# Patient Record
Sex: FEMALE | Race: WHITE | Employment: OTHER | URBAN - METROPOLITAN AREA
[De-identification: names, ages, dates, MRNs, and addresses within clinical notes are randomized per-mention and may not be internally consistent; named-entity substitution may affect disease eponyms.]

---

## 2017-02-02 ENCOUNTER — TELEPHONE (OUTPATIENT)
Dept: PULMONOLOGY | Facility: CLINIC | Age: 72
End: 2017-02-02

## 2017-02-02 DIAGNOSIS — G47.33 OSA (OBSTRUCTIVE SLEEP APNEA): Primary | ICD-10-CM

## 2017-02-03 ENCOUNTER — TELEPHONE (OUTPATIENT)
Dept: PULMONOLOGY | Facility: CLINIC | Age: 72
End: 2017-02-03

## 2017-02-03 NOTE — TELEPHONE ENCOUNTER
Fax received for cpap supplies, signed by [facility], faxed back to [facility].

## 2017-02-03 NOTE — TELEPHONE ENCOUNTER
Left message for patient to call back. Per Dr. Sisi Watt, patient is still having too many events per hour 10.5. He would like to increase her CPAP settings from 9 to 12. We will send this order to [facility] once we speak with the patient.  Dr. Sisi Watt wants to pat

## 2017-02-10 ENCOUNTER — OFFICE VISIT (OUTPATIENT)
Dept: OBGYN CLINIC | Facility: CLINIC | Age: 72
End: 2017-02-10

## 2017-02-10 VITALS
SYSTOLIC BLOOD PRESSURE: 101 MMHG | WEIGHT: 243 LBS | BODY MASS INDEX: 45.88 KG/M2 | DIASTOLIC BLOOD PRESSURE: 66 MMHG | HEIGHT: 61 IN | HEART RATE: 56 BPM

## 2017-02-10 DIAGNOSIS — N95.0 POST-MENOPAUSAL BLEEDING: Primary | ICD-10-CM

## 2017-02-10 PROCEDURE — 99202 OFFICE O/P NEW SF 15 MIN: CPT | Performed by: OBSTETRICS & GYNECOLOGY

## 2017-02-10 NOTE — TELEPHONE ENCOUNTER
Attempted to contact patient regarding pressure change for CPAP but no answer. Letter mailed to patient with new pressure changes and to make sure a download gets done within 30 days. Order faxed to E to change settings.

## 2017-02-10 NOTE — PROGRESS NOTES
Troy Merrill is a 80year old female  No LMP recorded. Patient is postmenopausal. Patient presents with:  Gyn Problem: PMB     New pt. Here for problem. For last 3 months, would have 1-2 days of spotting each month. H/o fibroids.       OBSTETR Disp: 30 tablet Rfl: 2   Rosuvastatin Calcium (CRESTOR) 10 MG Oral Tab TAKE 1 TABLET BY MOUTH EVERY NIGHT Disp: 30 tablet Rfl: 1   modafinil 200 MG Oral Tab Take 1 tablet (200 mg total) by mouth daily.  Disp: 30 tablet Rfl: 2   Calcium Carbonate-Vitamin D 5

## 2017-02-17 ENCOUNTER — TELEPHONE (OUTPATIENT)
Dept: PULMONOLOGY | Facility: CLINIC | Age: 72
End: 2017-02-17

## 2017-02-17 NOTE — TELEPHONE ENCOUNTER
Spoke to pt who stts nobody has contacted her from [facility] to increase settings on CPAP device, informed her to contact [facility] re: having a tech come out and change settings. Re-faxed order to increase settings to [facility] [phone]. Pt voiced understanding.

## 2017-02-20 ENCOUNTER — HOSPITAL ENCOUNTER (OUTPATIENT)
Dept: ULTRASOUND IMAGING | Age: 72
Discharge: HOME | End: 2017-02-20
Attending: OBSTETRICS & GYNECOLOGY
Payer: MEDICARE

## 2017-02-20 DIAGNOSIS — N95.0 POST-MENOPAUSAL BLEEDING: ICD-10-CM

## 2017-02-20 PROCEDURE — 76830 TRANSVAGINAL US NON-OB: CPT

## 2017-02-20 PROCEDURE — 76856 US EXAM PELVIC COMPLETE: CPT

## 2017-02-22 ENCOUNTER — TELEPHONE (OUTPATIENT)
Dept: INTERNAL MEDICINE CLINIC | Facility: CLINIC | Age: 72
End: 2017-02-22

## 2017-02-22 NOTE — TELEPHONE ENCOUNTER
Called pt to schedule Medicare [address] [place] [address]. Pt states PCP is Dr. Eliana Ambrosio. Encouraged pt to schedule PX appt with PCP.

## 2017-02-24 ENCOUNTER — OFFICE VISIT (OUTPATIENT)
Dept: OBGYN CLINIC | Facility: CLINIC | Age: 72
End: 2017-02-24

## 2017-02-24 VITALS
WEIGHT: 247 LBS | HEART RATE: 54 BPM | DIASTOLIC BLOOD PRESSURE: 68 MMHG | SYSTOLIC BLOOD PRESSURE: 142 MMHG | BODY MASS INDEX: 47 KG/M2

## 2017-02-24 DIAGNOSIS — N95.0 POSTMENOPAUSAL BLEEDING: Primary | ICD-10-CM

## 2017-02-24 PROCEDURE — 99213 OFFICE O/P EST LOW 20 MIN: CPT | Performed by: OBSTETRICS & GYNECOLOGY

## 2017-02-27 ENCOUNTER — APPOINTMENT (OUTPATIENT)
Dept: LAB | Age: 72
End: 2017-02-27
Attending: INTERNAL MEDICINE
Payer: MEDICARE

## 2017-02-27 ENCOUNTER — OFFICE VISIT (OUTPATIENT)
Dept: INTERNAL MEDICINE CLINIC | Facility: CLINIC | Age: 72
End: 2017-02-27

## 2017-02-27 VITALS
HEIGHT: 61 IN | WEIGHT: 253 LBS | SYSTOLIC BLOOD PRESSURE: 144 MMHG | TEMPERATURE: 98 F | HEART RATE: 50 BPM | DIASTOLIC BLOOD PRESSURE: 88 MMHG | BODY MASS INDEX: 47.77 KG/M2

## 2017-02-27 DIAGNOSIS — M17.0 OSTEOARTHRITIS OF BOTH KNEES, UNSPECIFIED OSTEOARTHRITIS TYPE: ICD-10-CM

## 2017-02-27 DIAGNOSIS — Z80.0 FAMILY HISTORY OF COLON CANCER: ICD-10-CM

## 2017-02-27 DIAGNOSIS — Z00.01 ENCOUNTER FOR GENERAL ADULT MEDICAL EXAMINATION WITH ABNORMAL FINDINGS: ICD-10-CM

## 2017-02-27 DIAGNOSIS — I10 ESSENTIAL HYPERTENSION: ICD-10-CM

## 2017-02-27 DIAGNOSIS — E55.9 VITAMIN D DEFICIENCY: ICD-10-CM

## 2017-02-27 DIAGNOSIS — R32 URINARY INCONTINENCE, UNSPECIFIED TYPE: ICD-10-CM

## 2017-02-27 DIAGNOSIS — G25.81 RESTLESS LEGS SYNDROME: ICD-10-CM

## 2017-02-27 DIAGNOSIS — R60.0 EDEMA OF BOTH LEGS: ICD-10-CM

## 2017-02-27 DIAGNOSIS — Z01.00 EYE EXAM, ROUTINE: ICD-10-CM

## 2017-02-27 DIAGNOSIS — E78.5 HYPERLIPIDEMIA, UNSPECIFIED HYPERLIPIDEMIA TYPE: ICD-10-CM

## 2017-02-27 DIAGNOSIS — Z91.81 AT HIGH RISK FOR FALLS: ICD-10-CM

## 2017-02-27 DIAGNOSIS — J30.9 ALLERGIC RHINITIS, UNSPECIFIED ALLERGIC RHINITIS TRIGGER, UNSPECIFIED RHINITIS SEASONALITY: ICD-10-CM

## 2017-02-27 DIAGNOSIS — Z12.31 VISIT FOR SCREENING MAMMOGRAM: ICD-10-CM

## 2017-02-27 DIAGNOSIS — M15.9 OSTEOARTHRITIS OF MULTIPLE JOINTS, UNSPECIFIED OSTEOARTHRITIS TYPE: ICD-10-CM

## 2017-02-27 DIAGNOSIS — N95.0 POSTMENOPAUSAL BLEEDING: ICD-10-CM

## 2017-02-27 DIAGNOSIS — K21.9 GASTROESOPHAGEAL REFLUX DISEASE, ESOPHAGITIS PRESENCE NOT SPECIFIED: ICD-10-CM

## 2017-02-27 DIAGNOSIS — R73.9 HYPERGLYCEMIA: ICD-10-CM

## 2017-02-27 DIAGNOSIS — E66.01 MORBID OBESITY DUE TO EXCESS CALORIES (HCC): ICD-10-CM

## 2017-02-27 DIAGNOSIS — G47.33 SLEEP APNEA, OBSTRUCTIVE: ICD-10-CM

## 2017-02-27 DIAGNOSIS — N60.11 FIBROCYSTIC BREAST CHANGES OF BOTH BREASTS: ICD-10-CM

## 2017-02-27 DIAGNOSIS — G47.419 PRIMARY NARCOLEPSY WITHOUT CATAPLEXY: ICD-10-CM

## 2017-02-27 DIAGNOSIS — N60.12 FIBROCYSTIC BREAST CHANGES OF BOTH BREASTS: ICD-10-CM

## 2017-02-27 DIAGNOSIS — J45.20 MILD INTERMITTENT ASTHMA WITHOUT COMPLICATION: ICD-10-CM

## 2017-02-27 LAB
ALBUMIN SERPL BCP-MCNC: 3.8 G/DL (ref 3.5–4.8)
ALBUMIN/GLOB SERPL: 1.2 {RATIO} (ref 1–2)
ALP SERPL-CCNC: 76 U/L (ref 32–100)
ALT SERPL-CCNC: 18 U/L (ref 14–54)
ANION GAP SERPL CALC-SCNC: 8 MMOL/L (ref 0–18)
AST SERPL-CCNC: 20 U/L (ref 15–41)
BACTERIA UR QL AUTO: NEGATIVE /HPF
BILIRUB SERPL-MCNC: 0.5 MG/DL (ref 0.3–1.2)
BILIRUB UR QL: NEGATIVE
BUN SERPL-MCNC: 17 MG/DL (ref 8–20)
BUN/CREAT SERPL: 22.4 (ref 10–20)
CALCIUM SERPL-MCNC: 9.7 MG/DL (ref 8.5–10.5)
CHLORIDE SERPL-SCNC: 107 MMOL/L (ref 95–110)
CHOLEST SERPL-MCNC: 140 MG/DL (ref 110–200)
CLARITY UR: CLEAR
CO2 SERPL-SCNC: 30 MMOL/L (ref 22–32)
COLOR UR: YELLOW
CREAT SERPL-MCNC: 0.76 MG/DL (ref 0.5–1.5)
ERYTHROCYTE [DISTWIDTH] IN BLOOD BY AUTOMATED COUNT: 14.7 % (ref 11–15)
GLOBULIN PLAS-MCNC: 3.2 G/DL (ref 2.5–3.7)
GLUCOSE SERPL-MCNC: 116 MG/DL (ref 70–99)
GLUCOSE UR-MCNC: NEGATIVE MG/DL
HBA1C MFR BLD: 5.6 % (ref 4–6)
HCT VFR BLD AUTO: 39.4 % (ref 35–48)
HDLC SERPL-MCNC: 45 MG/DL
HGB BLD-MCNC: 13.2 G/DL (ref 12–16)
HGB UR QL STRIP.AUTO: NEGATIVE
KETONES UR-MCNC: NEGATIVE MG/DL
LDLC SERPL CALC-MCNC: 62 MG/DL (ref 0–99)
MCH RBC QN AUTO: 27.9 PG (ref 27–32)
MCHC RBC AUTO-ENTMCNC: 33.4 G/DL (ref 32–37)
MCV RBC AUTO: 83.5 FL (ref 80–100)
NITRITE UR QL STRIP.AUTO: NEGATIVE
NONHDLC SERPL-MCNC: 95 MG/DL
OSMOLALITY UR CALC.SUM OF ELEC: 303 MOSM/KG (ref 275–295)
PH UR: 6 [PH] (ref 5–8)
PLATELET # BLD AUTO: 176 K/UL (ref 140–400)
PMV BLD AUTO: 8.6 FL (ref 7.4–10.3)
POTASSIUM SERPL-SCNC: 4.9 MMOL/L (ref 3.3–5.1)
PROT SERPL-MCNC: 7 G/DL (ref 5.9–8.4)
PROT UR-MCNC: NEGATIVE MG/DL
RBC # BLD AUTO: 4.72 M/UL (ref 3.7–5.4)
RBC #/AREA URNS AUTO: <1 /HPF
SODIUM SERPL-SCNC: 145 MMOL/L (ref 136–144)
SP GR UR STRIP: 1.01 (ref 1–1.03)
T4 FREE SERPL-MCNC: 0.76 NG/DL (ref 0.58–1.64)
TRIGL SERPL-MCNC: 165 MG/DL (ref 1–149)
TSH SERPL-ACNC: 4.4 UIU/ML (ref 0.34–5.6)
UROBILINOGEN UR STRIP-ACNC: <2
VIT C UR-MCNC: NEGATIVE MG/DL
WBC # BLD AUTO: 5.5 K/UL (ref 4–11)
WBC #/AREA URNS AUTO: 1 /HPF

## 2017-02-27 PROCEDURE — 85027 COMPLETE CBC AUTOMATED: CPT

## 2017-02-27 PROCEDURE — 81001 URINALYSIS AUTO W/SCOPE: CPT

## 2017-02-27 PROCEDURE — 84443 ASSAY THYROID STIM HORMONE: CPT

## 2017-02-27 PROCEDURE — 84439 ASSAY OF FREE THYROXINE: CPT

## 2017-02-27 PROCEDURE — 83036 HEMOGLOBIN GLYCOSYLATED A1C: CPT

## 2017-02-27 PROCEDURE — 96160 PT-FOCUSED HLTH RISK ASSMT: CPT | Performed by: INTERNAL MEDICINE

## 2017-02-27 PROCEDURE — 82306 VITAMIN D 25 HYDROXY: CPT

## 2017-02-27 PROCEDURE — 99213 OFFICE O/P EST LOW 20 MIN: CPT | Performed by: INTERNAL MEDICINE

## 2017-02-27 PROCEDURE — 80053 COMPREHEN METABOLIC PANEL: CPT

## 2017-02-27 PROCEDURE — G0463 HOSPITAL OUTPT CLINIC VISIT: HCPCS | Performed by: INTERNAL MEDICINE

## 2017-02-27 PROCEDURE — 80061 LIPID PANEL: CPT

## 2017-02-27 PROCEDURE — 36415 COLL VENOUS BLD VENIPUNCTURE: CPT

## 2017-02-27 NOTE — PROGRESS NOTES
HPI:    Patient ID: Alexei Colby is a 80year old female.     HPI Medicare Annual exam for follow up of chonic medical probllem    Including but not limited to  Hypertension  Sleep apnea  complaitn with CPaP  Asthma   PFT  With carol response Cancer Mother      lung   • Heart Disorder Mother    • Hypertension Mother    • Heart Disorder Sister    • Fuchs' dystrophy Sister    • Heart Disorder Brother    • Glaucoma Brother    • Diabetes Other    • Macular degeneration Neg         Smoking Status: F Rfl: 1   celecoxib 200 MG Oral Cap Take 1 capsule (200 mg total) by mouth daily.  Disp: 30 capsule Rfl: 1   Esomeprazole Magnesium 40 MG Oral Capsule Delayed Release Take 1 capsule (40 mg total) by mouth every morning before breakfast. Disp: 30 capsule Rfl: Glaucoma Brother    • Diabetes Other    • Macular degeneration Neg       Social History:   Smoking Status: Former Smoker                   Packs/Day: 1.00  Years: 35        Types: Cigarettes      Quit date: 10/12/2002    Smokeless Status: Never Used Monitor    (E78.5) Hyperlipidemia, unspecified hyperlipidemia type  Plan: LIPID PANEL, FREE T4 (FREE THYROXINE), ASSAY,         THYROID STIM HORMONE        Low hcol diet adivsed    (M15.9) Osteoarthritis of multiple joints, unspecified osteoarthritis type routine  Plan: OPHTHALMOLOGY - INTERNAL        Referral given    (Z91.81) At high risk for falls  Plan: fall precuation adivsd    (J30.9) Allergic rhinitis, unspecified allergic rhinitis trigger, unspecified rhinitis seasonality  Plan: chronic  Prn otc med 55-79, do you take aspirin?: Yes    Have you had any immunizations at another office such as Influenza, Hepatitis B, Tetanus, or Pneumococcal?: No     Functional Ability     Bathing or Showering: Able without help    Toileting: Able without help    Dressin Correct    What month is it?: Correct    What year is it?: Correct    Recall \"Ball\": Correct    Recall \"Flag\": Correct    Recall \"Tree\":  Incorrect      ALLERGIES:     Adhesive Tape           Rash  Nuts                    Hives    CURRENT MEDICATIONS: Rfl: 0      MEDICAL INFORMATION:   Past Medical History   Diagnosis Date   • Hyperlipidemia    • Hyperopia with presbyopia of both eyes 10/19/2015   • Age-related nuclear cataract of both eyes 10/19/2015   • Obesity    • Restless leg syndrome    • Narcolep Assessment. PLAN SUMMARY:   Medicare Annual EXam  See above notes for detail     The patient indicates understanding of these issues and agrees to the plan. The patient is asked to return in 1 month for follow up.        PREVENTATIVE SERVICES  INDICATI 09/15/16  -PNEUMOCOCCAL VACC, 13 MADDIE IM    Update Immunization Activity if applicable    Hepatitis B No orders found for this or any previous visit. Update Immunization Activity if applicable    Tetanus No orders found for this or any previous visit.  Camilo Snellen this patient. Pap All Patients q2 year if indicated There are no preventive care reminders to display for this patient.    Mammogram Age > 39 q1 year Mammogram,1 Yr due on 07/27/2017   EKG All Patients One at initial exam completed   Vaccines:      Pneumo

## 2017-02-27 NOTE — PROGRESS NOTES
Irvin Champagne is a 80year old female  No LMP recorded. Patient is postmenopausal. Patient presents with: Follow - Up: US followup    Last seen 2/10/17. In last 3 months, having spotting x 1-2 days q month.     Had pelvic u/s on 17--  8x7x9 Take 1 tablet (600 mg total) by mouth 2 (two) times daily.  Disp: 60 tablet Rfl: 1   Montelukast Sodium 10 MG Oral Tab TAKE 1 TABLET BY MOUTH NIGHTLY Disp: 30 tablet Rfl: 1   Oxybutynin Chloride 5 MG Oral Tab Take 1 tablet (5 mg total) by mouth 2 (two) time in this encounter

## 2017-03-01 ENCOUNTER — TELEPHONE (OUTPATIENT)
Dept: INTERNAL MEDICINE CLINIC | Facility: CLINIC | Age: 72
End: 2017-03-01

## 2017-03-01 LAB — 25(OH)D3 SERPL-MCNC: 27.9 NG/ML

## 2017-03-01 NOTE — TELEPHONE ENCOUNTER
Advised patient of Dr. Dariana Sanford note. Patient verbalized understanding and will call back to schedule an appointment.

## 2017-03-01 NOTE — TELEPHONE ENCOUNTER
----- Message from Luis Felipe Graham MD sent at 3/1/2017  2:33 AM CST -----  Send  Letter and copy of test result.   Blood sugar and triglycerides are borderline high  Limit carbohydrate intake    Other labs are within normal  Come in to discuss within the ne

## 2017-03-03 ENCOUNTER — TELEPHONE (OUTPATIENT)
Dept: OBGYN CLINIC | Facility: CLINIC | Age: 72
End: 2017-03-03

## 2017-03-03 DIAGNOSIS — D25.9 UTERINE LEIOMYOMA, UNSPECIFIED LOCATION: ICD-10-CM

## 2017-03-03 DIAGNOSIS — E55.9 VITAMIN D DEFICIENCY: Primary | ICD-10-CM

## 2017-03-03 DIAGNOSIS — N95.0 POST-MENOPAUSAL BLEEDING: Primary | ICD-10-CM

## 2017-03-03 RX ORDER — ERGOCALCIFEROL 1.25 MG/1
50000 CAPSULE ORAL WEEKLY
Qty: 12 CAPSULE | Refills: 0 | Status: SHIPPED | OUTPATIENT
Start: 2017-03-03 | End: 2017-05-25

## 2017-03-03 NOTE — TELEPHONE ENCOUNTER
Spoke with pt. Informed pt that I communicated with Dr Milind Peters.   Because of difficulty visualizing and palpating her cervix, and the fact she is only spotting a couple of days q month for last 3 months, going straight to hysterectomy instead of sampling is

## 2017-03-03 NOTE — TELEPHONE ENCOUNTER
Isela. Called patient to inform her JlK was not available 5/15/17 and we were hoping 5/1/17 would work for the patient. Please confirm patient is ok with scheduling the procedure that day.

## 2017-03-07 NOTE — TELEPHONE ENCOUNTER
Attempted to call pt at [phone] \"voice mail not set up yet\". Spoke with nephew and states this is his # now and to pls call pt at home # again since was on the other line when I called.  Pt states has to go the bank on the 3rd of every month to with

## 2017-03-14 NOTE — TELEPHONE ENCOUNTER
Patient is scheduled 5/22/17 7:30am SANTY ROLDAN to assist.  Celeste Parks orders routed. Patient's instructions mailed.

## 2017-03-15 ENCOUNTER — TELEPHONE (OUTPATIENT)
Dept: INTERNAL MEDICINE CLINIC | Facility: CLINIC | Age: 72
End: 2017-03-15

## 2017-03-15 NOTE — TELEPHONE ENCOUNTER
----- Message from Miguel Law MD sent at 3/15/2017 12:22 AM CDT -----  Send  Letter and copy of test result.   Blood sugar is borderline high  Labs are otherwsie stable  She got the vit D

## 2017-03-20 NOTE — TELEPHONE ENCOUNTER
PT STATES SHE RECEIVED THE LETTER IN THE MAIL WITH ALL OF HER INSTRUCTIONS. ADVISED SHE CAN GET ANTIMICROBIAL [address] FROM ANY PLACE THAT CARRIES PHARMACY GOODS.   ENCOURAGED TO CALL BACK WITH ANY QUESTIONS OR CONCERNS.  (REFERRAL HAS BEEN APPROVED)

## 2017-03-22 ENCOUNTER — OFFICE VISIT (OUTPATIENT)
Dept: INTERNAL MEDICINE CLINIC | Facility: CLINIC | Age: 72
End: 2017-03-22

## 2017-03-22 VITALS
TEMPERATURE: 98 F | HEART RATE: 57 BPM | SYSTOLIC BLOOD PRESSURE: 108 MMHG | HEIGHT: 61 IN | WEIGHT: 250 LBS | BODY MASS INDEX: 47.2 KG/M2 | DIASTOLIC BLOOD PRESSURE: 69 MMHG

## 2017-03-22 DIAGNOSIS — R32 URINARY INCONTINENCE, UNSPECIFIED TYPE: ICD-10-CM

## 2017-03-22 DIAGNOSIS — D25.9 UTERINE LEIOMYOMA, UNSPECIFIED LOCATION: ICD-10-CM

## 2017-03-22 DIAGNOSIS — Z01.818 PREOPERATIVE CLEARANCE: Primary | ICD-10-CM

## 2017-03-22 DIAGNOSIS — K21.9 GASTROESOPHAGEAL REFLUX DISEASE, ESOPHAGITIS PRESENCE NOT SPECIFIED: ICD-10-CM

## 2017-03-22 DIAGNOSIS — M17.0 OSTEOARTHRITIS OF BOTH KNEES, UNSPECIFIED OSTEOARTHRITIS TYPE: ICD-10-CM

## 2017-03-22 DIAGNOSIS — N95.0 POSTMENOPAUSE BLEEDING: ICD-10-CM

## 2017-03-22 DIAGNOSIS — E66.01 MORBID OBESITY DUE TO EXCESS CALORIES (HCC): ICD-10-CM

## 2017-03-22 DIAGNOSIS — E78.2 MIXED HYPERLIPIDEMIA: ICD-10-CM

## 2017-03-22 DIAGNOSIS — I10 ESSENTIAL HYPERTENSION: ICD-10-CM

## 2017-03-22 DIAGNOSIS — J45.20 MILD INTERMITTENT ASTHMA WITHOUT COMPLICATION: ICD-10-CM

## 2017-03-22 DIAGNOSIS — G25.81 RESTLESS LEGS SYNDROME: ICD-10-CM

## 2017-03-22 DIAGNOSIS — M15.9 OSTEOARTHRITIS OF MULTIPLE JOINTS, UNSPECIFIED OSTEOARTHRITIS TYPE: ICD-10-CM

## 2017-03-22 DIAGNOSIS — R73.9 HYPERGLYCEMIA: ICD-10-CM

## 2017-03-22 DIAGNOSIS — Z91.81 AT HIGH RISK FOR FALLS: ICD-10-CM

## 2017-03-22 DIAGNOSIS — G47.33 SLEEP APNEA, OBSTRUCTIVE: ICD-10-CM

## 2017-03-22 PROCEDURE — 99214 OFFICE O/P EST MOD 30 MIN: CPT | Performed by: INTERNAL MEDICINE

## 2017-03-22 PROCEDURE — G0463 HOSPITAL OUTPT CLINIC VISIT: HCPCS | Performed by: INTERNAL MEDICINE

## 2017-03-22 RX ORDER — MULTIVIT-MIN/FOLIC ACID/LUTEIN 400-250MCG
1 TABLET,CHEWABLE ORAL DAILY
COMMUNITY

## 2017-03-28 ENCOUNTER — TELEPHONE (OUTPATIENT)
Dept: OBGYN CLINIC | Facility: CLINIC | Age: 72
End: 2017-03-28

## 2017-03-28 NOTE — TELEPHONE ENCOUNTER
Pt informed wash is called Hibiclens or Betasept. Instructions resent to pt. Pt verbalizes understanding.

## 2017-03-28 NOTE — TELEPHONE ENCOUNTER
PT REQ TO SPEAK WITH A NURSE BECAUSE SHE CAN'T FIND THE LETTER SHE RECEIVED THRU THE MAIL THE PRESURGICAL LETTER THAT EXPLAINED EVERYTHING / IT WAS A [address] THAT SHE NEED TO USE BEFORE THE SURGERY / SHE NEED ALL THE INSTRUCTION  / PLS ADV

## 2017-04-03 NOTE — PROGRESS NOTES
HPI:    Patient ID: Estrellita Soto is a 80year old female.     HPI    Pre OP Clearance  Surgeon Role   Ap Rangel MD Primary   Satnam Rowland MD     Procedure Laterality Anesthesia   Total abdominal hysterectomy with bilateral salpingo oophorectomy Component      Latest Ref Rng 2/27/2017   Glucose      70-99 mg/dL 116 (H)   Sodium      136-144 mmol/L 145 (H)   Potassium      3.3-5.1 mmol/L 4.9   Chloride       mmol/L 107   Carbon Dioxide, Total      22-32 mmol/L 30   BUN      8-20 mg/dL LDL Cholesterol Calc      0-99 mg/dL 62   HEMOGLOBIN A1c      4.0-6.0 % 5.6   T4,Free (Direct)      0.58-1.64 ng/dL 0.76   TSH      0.34-5.60 uIU/mL 4.40   Vitamin D, 25OH, Total       27.9   12/22/15  PFT  IMPRESSION:  1.  Significant bronchodilator res Rfl: 1   Montelukast Sodium 10 MG Oral Tab TAKE 1 TABLET BY MOUTH NIGHTLY Disp: 30 tablet Rfl: 1   Oxybutynin Chloride 5 MG Oral Tab Take 1 tablet (5 mg total) by mouth 2 (two) times daily.  Disp: 60 tablet Rfl: 1   Potassium Chloride ER 20 MEQ Oral Tab CR well-developed. No distress. HENT:   Head: Normocephalic and atraumatic. Right Ear: External ear normal.   Left Ear: External ear normal.   Nose: Nose normal.   Mouth/Throat: Oropharynx is clear and moist. No oropharyngeal exudate.    Eyes: Conjunctivae Gastroesophageal reflux disease, esophagitis presence not specified  Plan: GERD precuation    (M15.9) Osteoarthritis of multiple joints, unspecified osteoarthritis type  Plan: chronic    Hold NSAIDS ASA a week prior to surgery    (E78.2) Mixed hyperlipidem

## 2017-04-08 RX ORDER — CELECOXIB 200 MG/1
CAPSULE ORAL
Qty: 90 CAPSULE | Refills: 0 | Status: ON HOLD | OUTPATIENT
Start: 2017-04-08 | End: 2017-06-21

## 2017-04-08 NOTE — TELEPHONE ENCOUNTER
Refill Protocol Appointment Criteria  · Appointment scheduled in the past 6 months or in the next 3 months  Recent Visits       Provider Department Primary Dx    2 weeks ago Aureliano Child MD [facility], [facility], [address], [place] Preoperative clearance

## 2017-04-10 ENCOUNTER — TELEPHONE (OUTPATIENT)
Dept: PULMONOLOGY | Facility: CLINIC | Age: 72
End: 2017-04-10

## 2017-04-10 NOTE — TELEPHONE ENCOUNTER
Last ov note faxed to [facility] [phone] and left in [facility] folder for p/u.

## 2017-04-21 ENCOUNTER — LAB ENCOUNTER (OUTPATIENT)
Dept: LAB | Age: 72
End: 2017-04-21
Attending: INTERNAL MEDICINE
Payer: MEDICARE

## 2017-04-21 ENCOUNTER — OFFICE VISIT (OUTPATIENT)
Dept: OPHTHALMOLOGY | Facility: CLINIC | Age: 72
End: 2017-04-21

## 2017-04-21 ENCOUNTER — HOSPITAL ENCOUNTER (OUTPATIENT)
Dept: GENERAL RADIOLOGY | Age: 72
Discharge: HOME | End: 2017-04-21
Attending: INTERNAL MEDICINE
Payer: MEDICARE

## 2017-04-21 DIAGNOSIS — H52.03 HYPEROPIA WITH PRESBYOPIA OF BOTH EYES: ICD-10-CM

## 2017-04-21 DIAGNOSIS — Z01.818 PREOPERATIVE CLEARANCE: ICD-10-CM

## 2017-04-21 DIAGNOSIS — I10 ESSENTIAL HYPERTENSION: ICD-10-CM

## 2017-04-21 DIAGNOSIS — H52.4 HYPEROPIA WITH PRESBYOPIA OF BOTH EYES: ICD-10-CM

## 2017-04-21 DIAGNOSIS — G47.33 SLEEP APNEA, OBSTRUCTIVE: ICD-10-CM

## 2017-04-21 DIAGNOSIS — H04.123 BILATERAL DRY EYES: ICD-10-CM

## 2017-04-21 DIAGNOSIS — R73.9 HYPERGLYCEMIA: ICD-10-CM

## 2017-04-21 DIAGNOSIS — Z01.818 PREOP EXAMINATION: Primary | ICD-10-CM

## 2017-04-21 DIAGNOSIS — H01.00B BLEPHARITIS OF UPPER AND LOWER EYELIDS OF BOTH EYES, UNSPECIFIED TYPE: ICD-10-CM

## 2017-04-21 DIAGNOSIS — N95.0 POSTMENOPAUSE BLEEDING: ICD-10-CM

## 2017-04-21 DIAGNOSIS — R73.03 BORDERLINE DIABETIC: ICD-10-CM

## 2017-04-21 DIAGNOSIS — H25.13 AGE-RELATED NUCLEAR CATARACT OF BOTH EYES: Primary | ICD-10-CM

## 2017-04-21 DIAGNOSIS — H01.00A BLEPHARITIS OF UPPER AND LOWER EYELIDS OF BOTH EYES, UNSPECIFIED TYPE: ICD-10-CM

## 2017-04-21 PROCEDURE — 93010 ELECTROCARDIOGRAM REPORT: CPT | Performed by: INTERNAL MEDICINE

## 2017-04-21 PROCEDURE — 71020 XR CHEST PA + LAT CHEST (CPT=71020): CPT

## 2017-04-21 PROCEDURE — 93005 ELECTROCARDIOGRAM TRACING: CPT

## 2017-04-21 PROCEDURE — 92015 DETERMINE REFRACTIVE STATE: CPT | Performed by: OPHTHALMOLOGY

## 2017-04-21 PROCEDURE — 81001 URINALYSIS AUTO W/SCOPE: CPT

## 2017-04-21 PROCEDURE — 85730 THROMBOPLASTIN TIME PARTIAL: CPT

## 2017-04-21 PROCEDURE — 80053 COMPREHEN METABOLIC PANEL: CPT

## 2017-04-21 PROCEDURE — 92014 COMPRE OPH EXAM EST PT 1/>: CPT | Performed by: OPHTHALMOLOGY

## 2017-04-21 PROCEDURE — 85610 PROTHROMBIN TIME: CPT

## 2017-04-21 PROCEDURE — 36415 COLL VENOUS BLD VENIPUNCTURE: CPT

## 2017-04-21 PROCEDURE — 85025 COMPLETE CBC W/AUTO DIFF WBC: CPT

## 2017-04-21 RX ORDER — POTASSIUM CHLORIDE 20 MEQ/1
TABLET, EXTENDED RELEASE ORAL
COMMUNITY
Start: 2017-04-02 | End: 2017-05-18

## 2017-04-21 NOTE — PROGRESS NOTES
Oumar Arellano is a 80year old female. HPI:     HPI     Consult    Additional comments: Consult per Dr. Luis Deutsch   EP/ 80 yr old here for a complete exam. LDE 10/19/15 with hyperopia, presbyopia and cataracts OU.  Pt denies any blurre Rfl: 5   carvedilol 6.25 MG Oral Tab TAKE 1 TABLET BY MOUTH TWICE DAILY WITH MEALS Disp: 60 tablet Rfl: 1   Esomeprazole Magnesium 40 MG Oral Capsule Delayed Release Take 1 capsule (40 mg total) by mouth every morning before breakfast. Disp: 30 capsule Rfl Tonometry #2 (Applanation, 11:00 AM)      Right Left   Pressure 14 13         Pupils     3/3, 2+/2+, no APD        Visual Fields      Left Right   Result Full Full         Extraocular Movement      Right Left   Result Full Full         Dilation     Both noted.      Bilateral dry eyes  Advised pt to use OTC Systane artifical tears as needed during the day and Systane Gel at bedtime. No orders of the defined types were placed in this encounter.        Meds This Visit:    No prescriptions requested or o

## 2017-04-21 NOTE — PATIENT INSTRUCTIONS
Blepharitis of upper and lower eyelids of both eyes  Patient instructed to use lid hygiene twice daily. Apply baby shampoo to warm washcloth and scrub eyelids gently with eyes closed, then rinse thoroughly.         Age-related nuclear cataract of both eyes

## 2017-04-24 ENCOUNTER — OFFICE VISIT (OUTPATIENT)
Dept: INTERNAL MEDICINE CLINIC | Facility: CLINIC | Age: 72
End: 2017-04-24

## 2017-04-24 VITALS
SYSTOLIC BLOOD PRESSURE: 166 MMHG | RESPIRATION RATE: 16 BRPM | TEMPERATURE: 99 F | WEIGHT: 251 LBS | BODY MASS INDEX: 47 KG/M2 | HEART RATE: 56 BPM | DIASTOLIC BLOOD PRESSURE: 82 MMHG

## 2017-04-24 DIAGNOSIS — E66.01 MORBID OBESITY, UNSPECIFIED OBESITY TYPE (HCC): ICD-10-CM

## 2017-04-24 DIAGNOSIS — R60.0 EDEMA OF BOTH LEGS: ICD-10-CM

## 2017-04-24 DIAGNOSIS — I10 ESSENTIAL HYPERTENSION: ICD-10-CM

## 2017-04-24 DIAGNOSIS — Z01.818 PREOPERATIVE CLEARANCE: Primary | ICD-10-CM

## 2017-04-24 DIAGNOSIS — E78.2 MIXED HYPERLIPIDEMIA: ICD-10-CM

## 2017-04-24 DIAGNOSIS — D25.9 UTERINE LEIOMYOMA, UNSPECIFIED LOCATION: ICD-10-CM

## 2017-04-24 DIAGNOSIS — G47.33 SLEEP APNEA, OBSTRUCTIVE: ICD-10-CM

## 2017-04-24 DIAGNOSIS — N95.0 POSTMENOPAUSE BLEEDING: ICD-10-CM

## 2017-04-24 DIAGNOSIS — R73.9 HYPERGLYCEMIA: ICD-10-CM

## 2017-04-24 DIAGNOSIS — J45.20 MILD INTERMITTENT ASTHMA WITHOUT COMPLICATION: ICD-10-CM

## 2017-04-24 DIAGNOSIS — R94.31 ABNORMAL EKG: ICD-10-CM

## 2017-04-24 DIAGNOSIS — B36.9 DERMATOMYCOSIS: ICD-10-CM

## 2017-04-24 PROCEDURE — 99215 OFFICE O/P EST HI 40 MIN: CPT | Performed by: INTERNAL MEDICINE

## 2017-04-24 PROCEDURE — G0463 HOSPITAL OUTPT CLINIC VISIT: HCPCS | Performed by: INTERNAL MEDICINE

## 2017-04-24 RX ORDER — KETOCONAZOLE 20 MG/G
CREAM TOPICAL
Qty: 120 G | Refills: 2 | Status: SHIPPED | OUTPATIENT
Start: 2017-04-24

## 2017-04-26 NOTE — PROGRESS NOTES
HPI:    Patient ID: Oumar Arellano is a 80year old female.     HPI  PT is having a total hysterectomy nad oophorectomy 5/22/2017     General Information      Date: 5/22/2017 Case classification:  Status: Scheduled     Location: [facility] MAIN OR Room: OR 02 Ser Diabetes Other    • Macular degeneration Neg         Smoking Status: Former Smoker                   Packs/Day: 1.00  Years: 35        Types: Cigarettes      Quit date: 10/12/2002    Smokeless Status: Never Used                        Alcohol Use: No Inhale 2 puffs into the lungs 4 (four) times daily as needed for Wheezing.  Disp: 1 Inhaler Rfl: 5   carvedilol 6.25 MG Oral Tab TAKE 1 TABLET BY MOUTH TWICE DAILY WITH MEALS Disp: 60 tablet Rfl: 1   Esomeprazole Magnesium 40 MG Oral Capsule Delayed Release and  optimization of comfort.  It is notable, however, that when the patient  turns on her back, there is an increase in respiratory events. RECOMMENDATIONS:  1)     Patient should avoid the supine position.   2.     CPAP 9 CWP with C-Flex set to 3 CWP usi pulses. Exam reveals no gallop. No murmur heard. Pulmonary/Chest: Effort normal and breath sounds normal. No respiratory distress. She has no wheezes. She has no rales. She exhibits no tenderness. Abdominal: Soft.  Bowel sounds are normal. She exhibi Absolute      0.0-1.0 K/UL 0.4   Eosinophils Absolute      0.0-0.7 K/UL 0.1   Basophils Absolute      0.0-0.2 K/UL 0.0   URINE-COLOR      Yellow Yellow   APPEARANCE      Clear Hazy (A)   SPECIFIC GRAVITY      1.002-1.035 1.010   PH, URINE      5.0-8.0 6.0 cardiac clearance prior to Surgery.    (D25.9) Uterine leiomyoma, unspecified location  Plan: as above    (N95.0) Postmenopause bleeding  Plan: as above    (R94.31) Abnormal EKG  Plan: cardiac clearance  Has an appt with  Cardiology next week    (I10) Keli

## 2017-05-02 ENCOUNTER — TELEPHONE (OUTPATIENT)
Dept: INTERNAL MEDICINE CLINIC | Facility: CLINIC | Age: 72
End: 2017-05-02

## 2017-05-02 DIAGNOSIS — E78.5 HYPERLIPIDEMIA, UNSPECIFIED HYPERLIPIDEMIA TYPE: Primary | ICD-10-CM

## 2017-05-02 DIAGNOSIS — R94.31 ECG ABNORMAL: ICD-10-CM

## 2017-05-03 ENCOUNTER — OFFICE VISIT (OUTPATIENT)
Dept: CARDIOLOGY CLINIC | Facility: CLINIC | Age: 72
End: 2017-05-03

## 2017-05-03 VITALS
SYSTOLIC BLOOD PRESSURE: 186 MMHG | HEART RATE: 60 BPM | WEIGHT: 253 LBS | DIASTOLIC BLOOD PRESSURE: 88 MMHG | HEIGHT: 61.5 IN | BODY MASS INDEX: 47.16 KG/M2 | RESPIRATION RATE: 20 BRPM

## 2017-05-03 DIAGNOSIS — Z01.810 PREOP CARDIOVASCULAR EXAM: ICD-10-CM

## 2017-05-03 DIAGNOSIS — I10 ESSENTIAL HYPERTENSION: ICD-10-CM

## 2017-05-03 DIAGNOSIS — E78.2 MIXED HYPERLIPIDEMIA: Primary | ICD-10-CM

## 2017-05-03 DIAGNOSIS — R94.31 ABNORMAL EKG: ICD-10-CM

## 2017-05-03 PROCEDURE — 99204 OFFICE O/P NEW MOD 45 MIN: CPT | Performed by: INTERNAL MEDICINE

## 2017-05-03 PROCEDURE — G0463 HOSPITAL OUTPT CLINIC VISIT: HCPCS | Performed by: INTERNAL MEDICINE

## 2017-05-03 NOTE — PROGRESS NOTES
Cardiology Consult Note    5/3/2017    Tan Syed is a 80year old female.   HPI:   This is a 67-year-old female who presents for initial evaluation patient is scheduled for total hysterectomy and oophorectomy on May 22, 2017 was noted to have abnormal Disp: 30 tablet Rfl: 1   Oxybutynin Chloride 5 MG Oral Tab Take 1 tablet (5 mg total) by mouth 2 (two) times daily. Disp: 60 tablet Rfl: 1   Potassium Chloride ER 20 MEQ Oral Tab CR Take 20 mEq by mouth daily.  Disp: 30 tablet Rfl: 2   Rosuvastatin Calcium without murmur,nl S1,S2,good distal pulse  GI: good BS's,no masses, HSM or tenderness  EXTREMITIES: no cyanosis, clubbing, +trace edema  NEURO:no focal deficits      Assessment  ASSESSMENT AND PLAN:     (E78.2) Mixed hyperlipidemia  (primary encounter diag

## 2017-05-05 ENCOUNTER — TELEPHONE (OUTPATIENT)
Dept: PULMONOLOGY | Facility: CLINIC | Age: 72
End: 2017-05-05

## 2017-05-05 NOTE — TELEPHONE ENCOUNTER
LMTCB. The pulm office can obtain data download through [facility] website.

## 2017-05-08 NOTE — TELEPHONE ENCOUNTER
Pt stts her pressure was changed on her cpap machine. Pt would like her data download given to the Dr. Mj Carson for review. Pt notified that a data download will be obtained and placed in Dr. Mj Carson folder for review.

## 2017-05-11 ENCOUNTER — HOSPITAL ENCOUNTER (OUTPATIENT)
Dept: NUCLEAR MEDICINE | Facility: HOSPITAL | Age: 72
Discharge: HOME | End: 2017-05-11
Attending: INTERNAL MEDICINE
Payer: MEDICARE

## 2017-05-11 ENCOUNTER — HOSPITAL ENCOUNTER (OUTPATIENT)
Dept: CV DIAGNOSTICS | Facility: HOSPITAL | Age: 72
Discharge: HOME | End: 2017-05-11
Attending: INTERNAL MEDICINE
Payer: MEDICARE

## 2017-05-11 DIAGNOSIS — R94.31 ABNORMAL EKG: ICD-10-CM

## 2017-05-11 PROCEDURE — 93016 CV STRESS TEST SUPVJ ONLY: CPT | Performed by: INTERNAL MEDICINE

## 2017-05-11 PROCEDURE — 93018 CV STRESS TEST I&R ONLY: CPT | Performed by: INTERNAL MEDICINE

## 2017-05-11 PROCEDURE — 78452 HT MUSCLE IMAGE SPECT MULT: CPT | Performed by: INTERNAL MEDICINE

## 2017-05-11 PROCEDURE — 93017 CV STRESS TEST TRACING ONLY: CPT | Performed by: INTERNAL MEDICINE

## 2017-05-11 RX ORDER — 0.9 % SODIUM CHLORIDE 0.9 %
VIAL (ML) INJECTION
Status: COMPLETED
Start: 2017-05-11 | End: 2017-05-11

## 2017-05-11 RX ORDER — SODIUM CHLORIDE 9 MG/ML
INJECTION, SOLUTION INTRAVENOUS
Status: DISPENSED
Start: 2017-05-11 | End: 2017-05-12

## 2017-05-11 RX ADMIN — 0.9 % SODIUM CHLORIDE 10 ML: 0.9 % VIAL (ML) INJECTION at 14:01:00

## 2017-05-11 NOTE — TELEPHONE ENCOUNTER
Per Dr. Jiménez Basket. Pt cpap data download showed acceptable usage and AHI. Data download sent to scanning.

## 2017-05-11 NOTE — IMAGING NOTE
Patient here for outpatient nuclear exercise stress test, ordered by Dr. Emilio Chung. Patient states she will not be able to walk on treadmill due to bilateral arthritic knees. Also, patient had bilateral calf edema.  I called Dr. Juliette Lacy, cardiology go-to for t

## 2017-05-17 ENCOUNTER — TELEPHONE (OUTPATIENT)
Dept: OBGYN CLINIC | Facility: CLINIC | Age: 72
End: 2017-05-17

## 2017-05-17 ENCOUNTER — APPOINTMENT (OUTPATIENT)
Dept: LAB | Age: 72
End: 2017-05-17
Attending: INTERNAL MEDICINE
Payer: MEDICARE

## 2017-05-17 DIAGNOSIS — Z01.818 PREOP EXAMINATION: ICD-10-CM

## 2017-05-17 PROCEDURE — 87641 MR-STAPH DNA AMP PROBE: CPT

## 2017-05-18 NOTE — TELEPHONE ENCOUNTER
Patient called and stated she lost her instructions and I advised her to pick them up at the  and asked if she'd taken any asprin, patient stated she took asprin today 5/18/17. Patient advised not to take anymore aspirin prior to surgery.   I inf

## 2017-05-19 NOTE — TELEPHONE ENCOUNTER
P.A.T. AND SURGERY  NOTIFIED CASE IS CANCELLED DUE TO PT ON ASPIRIN 325MG THROUGH YESTERDAY PER [facility] (N CALL). JLK AND NJG NOTIFIED.

## 2017-05-22 NOTE — TELEPHONE ENCOUNTER
GOT A VOICE MAIL MESSAGE FROM FEMALE THAT SAID SHE IS PT'S DAUGHTER-IN-LAW AND ASKED US TO CALL HER ABOUT RESCHEDULING THE SURGERY. SPOKE WITH PT'S DAUGHTER-IN-LAW AND EXPLAINED THERE IS NO SIGNED ALLEGRA TO DISCUSS DATES OR THE SURGERY WITH HER.   TRITCB FOR T

## 2017-05-23 NOTE — TELEPHONE ENCOUNTER
Patient's procedure is rescheduled to 6/19/17 7:30 [facility] SANTY/JAMAR. Pat orders routed. Patient states she has instructions remailed.

## 2017-05-23 NOTE — TELEPHONE ENCOUNTER
Morning Dr. Martinez Muro,    Will you be available to assist with this procedure 6/19/17 at 7:30am?      Maggy

## 2017-05-25 RX ORDER — ERGOCALCIFEROL 1.25 MG/1
CAPSULE ORAL
Qty: 3 CAPSULE | Refills: 3 | Status: SHIPPED | OUTPATIENT
Start: 2017-05-25

## 2017-06-06 NOTE — TELEPHONE ENCOUNTER
Dr. Esteban Argueta: please confirm continue use of furosemide.     Hypertensive Medications  Protocol Criteria:  · Appointment scheduled in the past 6 months or in the next 3 months  · BMP or CMP in the past 12 months  · Creatinine result < 2  Recent Visits

## 2017-06-06 NOTE — TELEPHONE ENCOUNTER
Please advise on continue use of Lily Randy; there is another refill request for furosemide.   Refill Protocol Appointment Criteria  · Appointment scheduled in the past 6 months or in the next 3 months  Recent Visits       Provider Department Primary Dx    1 m

## 2017-06-07 ENCOUNTER — TELEPHONE (OUTPATIENT)
Dept: OBGYN CLINIC | Facility: CLINIC | Age: 72
End: 2017-06-07

## 2017-06-07 NOTE — TELEPHONE ENCOUNTER
SWETHA CALLING FROM PAT REQUESTING ANOTHER SURGERY REQUEST FORM / NEED PT CORRECT NAME ON IT FOR HER SURGERY WHICH IS SCHEDULE FOR  06/19/17 / PT NAME IS KALEY PEREZ / NICK ADV

## 2017-06-13 RX ORDER — ROSUVASTATIN CALCIUM 10 MG/1
TABLET, COATED ORAL
Qty: 30 TABLET | Refills: 0 | Status: SHIPPED | OUTPATIENT
Start: 2017-06-13

## 2017-06-13 RX ORDER — POTASSIUM CHLORIDE 20 MEQ/1
TABLET, EXTENDED RELEASE ORAL
Qty: 30 TABLET | Refills: 0 | Status: SHIPPED | OUTPATIENT
Start: 2017-06-13 | End: 2017-12-18

## 2017-06-13 NOTE — TELEPHONE ENCOUNTER
Cholesterol Medications  Refilled per protocol.    Protocol Criteria:  · Appointment scheduled in the past 12 months or in the next 3 months  · ALT & LDL on file in the past 12 months  · ALT result < 80  · LDL result <130   Recent Visits       Provider Willa

## 2017-06-13 NOTE — TELEPHONE ENCOUNTER
Refilled per protocol   On lasix, K+ normal  Hypertensive Medications  Protocol Criteria:  · Appointment scheduled in the past 6 months or in the next 3 months  · BMP or CMP in the past 12 months  · Creatinine result < 2  Recent Visits       Provider Depar

## 2017-06-14 ENCOUNTER — LAB ENCOUNTER (OUTPATIENT)
Dept: LAB | Age: 72
End: 2017-06-14
Attending: OBSTETRICS & GYNECOLOGY
Payer: MEDICARE

## 2017-06-14 DIAGNOSIS — Z01.818 PREOP TESTING: ICD-10-CM

## 2017-06-14 PROCEDURE — 86901 BLOOD TYPING SEROLOGIC RH(D): CPT

## 2017-06-14 PROCEDURE — 36415 COLL VENOUS BLD VENIPUNCTURE: CPT

## 2017-06-14 PROCEDURE — 86900 BLOOD TYPING SEROLOGIC ABO: CPT

## 2017-06-14 PROCEDURE — 86850 RBC ANTIBODY SCREEN: CPT

## 2017-06-15 RX ORDER — FUROSEMIDE 20 MG/1
TABLET ORAL
Qty: 60 TABLET | Refills: 0 | Status: SHIPPED | OUTPATIENT
Start: 2017-06-15 | End: 2017-08-30

## 2017-06-16 NOTE — H&P
Pre-Operative History and Physical        HPI:  Pepper Cook is a 80year old  No LMP recorded. Patient is postmenopausal. who presents for post menopausal bleeding. Having spotting 1-2 days q month for several months.     Had pelvic u/s on  x 14 days, Disp: 120 g, Rfl: 2  •  CELECOXIB 200 MG Oral Cap, TAKE 1 CAPSULE(200 MG) BY MOUTH DAILY, Disp: 90 capsule, Rfl: 0  •  Albuterol Sulfate  (90 BASE) MCG/ACT Inhalation Aero Soln, Inhale 2 puffs into the lungs 4 (four) times daily as needed SKIN DISINFECTANT CALLED LAINA    Social History   Marital Status: Single  Spouse Name: N/A    Years of Education: N/A  Number of Children: N/A     Occupational History  None on file     Social History Main Topics   Smoking status: Former Smoker  1.00 Pa

## 2017-06-19 ENCOUNTER — SURGERY (OUTPATIENT)
Age: 72
End: 2017-06-19

## 2017-06-19 ENCOUNTER — ANESTHESIA EVENT (OUTPATIENT)
Dept: SURGERY | Facility: HOSPITAL | Age: 72
DRG: 743 | End: 2017-06-19
Payer: MEDICARE

## 2017-06-19 ENCOUNTER — HOSPITAL ENCOUNTER (INPATIENT)
Facility: HOSPITAL | Age: 72
LOS: 2 days | Discharge: HOME | DRG: 743 | End: 2017-06-21
Attending: OBSTETRICS & GYNECOLOGY | Admitting: OBSTETRICS & GYNECOLOGY
Payer: MEDICARE

## 2017-06-19 ENCOUNTER — TELEPHONE (OUTPATIENT)
Dept: OBGYN CLINIC | Facility: CLINIC | Age: 72
End: 2017-06-19

## 2017-06-19 ENCOUNTER — ANESTHESIA (OUTPATIENT)
Dept: SURGERY | Facility: HOSPITAL | Age: 72
DRG: 743 | End: 2017-06-19
Payer: MEDICARE

## 2017-06-19 DIAGNOSIS — N95.0 POST-MENOPAUSAL BLEEDING: ICD-10-CM

## 2017-06-19 DIAGNOSIS — D25.9 UTERINE LEIOMYOMA, UNSPECIFIED LOCATION: ICD-10-CM

## 2017-06-19 DIAGNOSIS — Z01.818 PREOP TESTING: Primary | ICD-10-CM

## 2017-06-19 PROCEDURE — 0UT70ZZ RESECTION OF BILATERAL FALLOPIAN TUBES, OPEN APPROACH: ICD-10-PCS | Performed by: OBSTETRICS & GYNECOLOGY

## 2017-06-19 PROCEDURE — 0UTC0ZZ RESECTION OF CERVIX, OPEN APPROACH: ICD-10-PCS | Performed by: OBSTETRICS & GYNECOLOGY

## 2017-06-19 PROCEDURE — 0UT20ZZ RESECTION OF BILATERAL OVARIES, OPEN APPROACH: ICD-10-PCS | Performed by: OBSTETRICS & GYNECOLOGY

## 2017-06-19 PROCEDURE — 0UT90ZZ RESECTION OF UTERUS, OPEN APPROACH: ICD-10-PCS | Performed by: OBSTETRICS & GYNECOLOGY

## 2017-06-19 RX ORDER — NEOSTIGMINE METHYLSULFATE 0.5 MG/ML
INJECTION INTRAVENOUS AS NEEDED
Status: DISCONTINUED | OUTPATIENT
Start: 2017-06-19 | End: 2017-06-19 | Stop reason: SURG

## 2017-06-19 RX ORDER — KETOROLAC TROMETHAMINE 30 MG/ML
30 INJECTION, SOLUTION INTRAMUSCULAR; INTRAVENOUS EVERY 6 HOURS
Status: DISPENSED | OUTPATIENT
Start: 2017-06-19 | End: 2017-06-20

## 2017-06-19 RX ORDER — SODIUM CHLORIDE, SODIUM LACTATE, POTASSIUM CHLORIDE, CALCIUM CHLORIDE 600; 310; 30; 20 MG/100ML; MG/100ML; MG/100ML; MG/100ML
INJECTION, SOLUTION INTRAVENOUS CONTINUOUS
Status: DISCONTINUED | OUTPATIENT
Start: 2017-06-19 | End: 2017-06-20

## 2017-06-19 RX ORDER — NALOXONE HYDROCHLORIDE 0.4 MG/ML
80 INJECTION, SOLUTION INTRAMUSCULAR; INTRAVENOUS; SUBCUTANEOUS AS NEEDED
Status: DISCONTINUED | OUTPATIENT
Start: 2017-06-19 | End: 2017-06-19 | Stop reason: HOSPADM

## 2017-06-19 RX ORDER — FUROSEMIDE 20 MG/1
20 TABLET ORAL
Status: DISCONTINUED | OUTPATIENT
Start: 2017-06-19 | End: 2017-06-21

## 2017-06-19 RX ORDER — GABAPENTIN 300 MG/1
600 CAPSULE ORAL 2 TIMES DAILY
Status: DISCONTINUED | OUTPATIENT
Start: 2017-06-19 | End: 2017-06-21

## 2017-06-19 RX ORDER — HYDROCODONE BITARTRATE AND ACETAMINOPHEN 5; 325 MG/1; MG/1
1 TABLET ORAL AS NEEDED
Status: DISCONTINUED | OUTPATIENT
Start: 2017-06-19 | End: 2017-06-19 | Stop reason: HOSPADM

## 2017-06-19 RX ORDER — METOPROLOL TARTRATE 5 MG/5ML
2.5 INJECTION INTRAVENOUS ONCE
Status: DISCONTINUED | OUTPATIENT
Start: 2017-06-19 | End: 2017-06-19 | Stop reason: HOSPADM

## 2017-06-19 RX ORDER — ONDANSETRON 2 MG/ML
4 INJECTION INTRAMUSCULAR; INTRAVENOUS EVERY 6 HOURS PRN
Status: DISCONTINUED | OUTPATIENT
Start: 2017-06-19 | End: 2017-06-21

## 2017-06-19 RX ORDER — DIPHENHYDRAMINE HYDROCHLORIDE 50 MG/ML
12.5 INJECTION INTRAMUSCULAR; INTRAVENOUS EVERY 4 HOURS PRN
Status: DISCONTINUED | OUTPATIENT
Start: 2017-06-19 | End: 2017-06-19

## 2017-06-19 RX ORDER — ENOXAPARIN SODIUM 100 MG/ML
40 INJECTION SUBCUTANEOUS DAILY
Status: DISCONTINUED | OUTPATIENT
Start: 2017-06-19 | End: 2017-06-20

## 2017-06-19 RX ORDER — LIDOCAINE HYDROCHLORIDE 10 MG/ML
INJECTION, SOLUTION EPIDURAL; INFILTRATION; INTRACAUDAL; PERINEURAL AS NEEDED
Status: DISCONTINUED | OUTPATIENT
Start: 2017-06-19 | End: 2017-06-19 | Stop reason: SURG

## 2017-06-19 RX ORDER — SODIUM CHLORIDE, SODIUM LACTATE, POTASSIUM CHLORIDE, CALCIUM CHLORIDE 600; 310; 30; 20 MG/100ML; MG/100ML; MG/100ML; MG/100ML
INJECTION, SOLUTION INTRAVENOUS CONTINUOUS PRN
Status: DISCONTINUED | OUTPATIENT
Start: 2017-06-19 | End: 2017-06-19 | Stop reason: SURG

## 2017-06-19 RX ORDER — ROCURONIUM BROMIDE 10 MG/ML
INJECTION, SOLUTION INTRAVENOUS AS NEEDED
Status: DISCONTINUED | OUTPATIENT
Start: 2017-06-19 | End: 2017-06-19 | Stop reason: SURG

## 2017-06-19 RX ORDER — CARVEDILOL 6.25 MG/1
6.25 TABLET ORAL 2 TIMES DAILY WITH MEALS
Status: DISCONTINUED | OUTPATIENT
Start: 2017-06-19 | End: 2017-06-21

## 2017-06-19 RX ORDER — DOCUSATE SODIUM 100 MG/1
100 CAPSULE, LIQUID FILLED ORAL 2 TIMES DAILY
Status: DISCONTINUED | OUTPATIENT
Start: 2017-06-20 | End: 2017-06-21

## 2017-06-19 RX ORDER — MORPHINE SULFATE 2 MG/ML
2 INJECTION, SOLUTION INTRAMUSCULAR; INTRAVENOUS EVERY 10 MIN PRN
Status: DISCONTINUED | OUTPATIENT
Start: 2017-06-19 | End: 2017-06-19 | Stop reason: HOSPADM

## 2017-06-19 RX ORDER — MORPHINE SULFATE 10 MG/ML
6 INJECTION, SOLUTION INTRAMUSCULAR; INTRAVENOUS EVERY 10 MIN PRN
Status: DISCONTINUED | OUTPATIENT
Start: 2017-06-19 | End: 2017-06-19 | Stop reason: HOSPADM

## 2017-06-19 RX ORDER — HYDROMORPHONE HYDROCHLORIDE 1 MG/ML
0.4 INJECTION, SOLUTION INTRAMUSCULAR; INTRAVENOUS; SUBCUTANEOUS EVERY 5 MIN PRN
Status: DISCONTINUED | OUTPATIENT
Start: 2017-06-19 | End: 2017-06-19 | Stop reason: HOSPADM

## 2017-06-19 RX ORDER — ALBUTEROL SULFATE 90 UG/1
2 AEROSOL, METERED RESPIRATORY (INHALATION) 4 TIMES DAILY PRN
Status: DISCONTINUED | OUTPATIENT
Start: 2017-06-19 | End: 2017-06-21

## 2017-06-19 RX ORDER — DIPHENHYDRAMINE HYDROCHLORIDE 50 MG/ML
12.5 INJECTION INTRAMUSCULAR; INTRAVENOUS EVERY 4 HOURS PRN
Status: DISCONTINUED | OUTPATIENT
Start: 2017-06-19 | End: 2017-06-21

## 2017-06-19 RX ORDER — HYDROCODONE BITARTRATE AND ACETAMINOPHEN 5; 325 MG/1; MG/1
2 TABLET ORAL AS NEEDED
Status: DISCONTINUED | OUTPATIENT
Start: 2017-06-19 | End: 2017-06-19 | Stop reason: HOSPADM

## 2017-06-19 RX ORDER — NALOXONE HYDROCHLORIDE 0.4 MG/ML
0.08 INJECTION, SOLUTION INTRAMUSCULAR; INTRAVENOUS; SUBCUTANEOUS
Status: DISCONTINUED | OUTPATIENT
Start: 2017-06-19 | End: 2017-06-21

## 2017-06-19 RX ORDER — SODIUM CHLORIDE 0.9 % (FLUSH) 0.9 %
10 SYRINGE (ML) INJECTION AS NEEDED
Status: DISCONTINUED | OUTPATIENT
Start: 2017-06-19 | End: 2017-06-21

## 2017-06-19 RX ORDER — DEXAMETHASONE SODIUM PHOSPHATE 4 MG/ML
VIAL (ML) INJECTION AS NEEDED
Status: DISCONTINUED | OUTPATIENT
Start: 2017-06-19 | End: 2017-06-19 | Stop reason: SURG

## 2017-06-19 RX ORDER — IBUPROFEN 600 MG/1
600 TABLET ORAL EVERY 6 HOURS PRN
Status: DISCONTINUED | OUTPATIENT
Start: 2017-06-20 | End: 2017-06-21

## 2017-06-19 RX ORDER — MIDAZOLAM HYDROCHLORIDE 1 MG/ML
INJECTION INTRAMUSCULAR; INTRAVENOUS AS NEEDED
Status: DISCONTINUED | OUTPATIENT
Start: 2017-06-19 | End: 2017-06-19 | Stop reason: SURG

## 2017-06-19 RX ORDER — POTASSIUM CHLORIDE 20 MEQ/1
20 TABLET, EXTENDED RELEASE ORAL
Status: DISCONTINUED | OUTPATIENT
Start: 2017-06-19 | End: 2017-06-21

## 2017-06-19 RX ORDER — PANTOPRAZOLE SODIUM 40 MG/1
40 TABLET, DELAYED RELEASE ORAL
Status: DISCONTINUED | OUTPATIENT
Start: 2017-06-19 | End: 2017-06-21

## 2017-06-19 RX ORDER — NALBUPHINE HCL 10 MG/ML
2.5 AMPUL (ML) INJECTION EVERY 4 HOURS PRN
Status: DISCONTINUED | OUTPATIENT
Start: 2017-06-19 | End: 2017-06-21

## 2017-06-19 RX ORDER — ONDANSETRON 2 MG/ML
4 INJECTION INTRAMUSCULAR; INTRAVENOUS EVERY 8 HOURS PRN
Status: DISCONTINUED | OUTPATIENT
Start: 2017-06-19 | End: 2017-06-21

## 2017-06-19 RX ORDER — FAMOTIDINE 20 MG/1
20 TABLET ORAL ONCE
Status: COMPLETED | OUTPATIENT
Start: 2017-06-19 | End: 2017-06-19

## 2017-06-19 RX ORDER — ACETAMINOPHEN 325 MG/1
650 TABLET ORAL ONCE
Status: COMPLETED | OUTPATIENT
Start: 2017-06-19 | End: 2017-06-19

## 2017-06-19 RX ORDER — ONDANSETRON 4 MG/1
4 TABLET, FILM COATED ORAL EVERY 8 HOURS PRN
Status: DISCONTINUED | OUTPATIENT
Start: 2017-06-19 | End: 2017-06-21

## 2017-06-19 RX ORDER — ONDANSETRON 2 MG/ML
INJECTION INTRAMUSCULAR; INTRAVENOUS AS NEEDED
Status: DISCONTINUED | OUTPATIENT
Start: 2017-06-19 | End: 2017-06-19 | Stop reason: SURG

## 2017-06-19 RX ORDER — HYDROMORPHONE HYDROCHLORIDE 1 MG/ML
0.2 INJECTION, SOLUTION INTRAMUSCULAR; INTRAVENOUS; SUBCUTANEOUS EVERY 5 MIN PRN
Status: DISCONTINUED | OUTPATIENT
Start: 2017-06-19 | End: 2017-06-19 | Stop reason: HOSPADM

## 2017-06-19 RX ORDER — MORPHINE SULFATE 4 MG/ML
4 INJECTION, SOLUTION INTRAMUSCULAR; INTRAVENOUS EVERY 10 MIN PRN
Status: DISCONTINUED | OUTPATIENT
Start: 2017-06-19 | End: 2017-06-19 | Stop reason: HOSPADM

## 2017-06-19 RX ORDER — HYDROMORPHONE HYDROCHLORIDE 1 MG/ML
0.6 INJECTION, SOLUTION INTRAMUSCULAR; INTRAVENOUS; SUBCUTANEOUS EVERY 5 MIN PRN
Status: DISCONTINUED | OUTPATIENT
Start: 2017-06-19 | End: 2017-06-19 | Stop reason: HOSPADM

## 2017-06-19 RX ORDER — GLYCOPYRROLATE 0.2 MG/ML
INJECTION INTRAMUSCULAR; INTRAVENOUS AS NEEDED
Status: DISCONTINUED | OUTPATIENT
Start: 2017-06-19 | End: 2017-06-19 | Stop reason: SURG

## 2017-06-19 RX ORDER — DEXTROSE, SODIUM CHLORIDE, SODIUM LACTATE, POTASSIUM CHLORIDE, AND CALCIUM CHLORIDE 5; .6; .31; .03; .02 G/100ML; G/100ML; G/100ML; G/100ML; G/100ML
INJECTION, SOLUTION INTRAVENOUS CONTINUOUS
Status: DISCONTINUED | OUTPATIENT
Start: 2017-06-19 | End: 2017-06-20

## 2017-06-19 RX ORDER — MONTELUKAST SODIUM 10 MG/1
10 TABLET ORAL NIGHTLY
Status: DISCONTINUED | OUTPATIENT
Start: 2017-06-19 | End: 2017-06-21

## 2017-06-19 RX ORDER — ONDANSETRON 2 MG/ML
4 INJECTION INTRAMUSCULAR; INTRAVENOUS ONCE AS NEEDED
Status: COMPLETED | OUTPATIENT
Start: 2017-06-19 | End: 2017-06-19

## 2017-06-19 RX ORDER — HYDROCODONE BITARTRATE AND ACETAMINOPHEN 7.5; 325 MG/1; MG/1
1 TABLET ORAL EVERY 4 HOURS PRN
Status: DISCONTINUED | OUTPATIENT
Start: 2017-06-20 | End: 2017-06-21

## 2017-06-19 RX ADMIN — DEXAMETHASONE SODIUM PHOSPHATE 4 MG: 4 MG/ML VIAL (ML) INJECTION at 07:47:00

## 2017-06-19 RX ADMIN — SODIUM CHLORIDE, SODIUM LACTATE, POTASSIUM CHLORIDE, CALCIUM CHLORIDE: 600; 310; 30; 20 INJECTION, SOLUTION INTRAVENOUS at 09:24:00

## 2017-06-19 RX ADMIN — SODIUM CHLORIDE, SODIUM LACTATE, POTASSIUM CHLORIDE, CALCIUM CHLORIDE: 600; 310; 30; 20 INJECTION, SOLUTION INTRAVENOUS at 07:36:00

## 2017-06-19 RX ADMIN — ONDANSETRON 4 MG: 2 INJECTION INTRAMUSCULAR; INTRAVENOUS at 09:14:00

## 2017-06-19 RX ADMIN — NEOSTIGMINE METHYLSULFATE 3 MG: 0.5 INJECTION INTRAVENOUS at 09:27:00

## 2017-06-19 RX ADMIN — MIDAZOLAM HYDROCHLORIDE 2 MG: 1 INJECTION INTRAMUSCULAR; INTRAVENOUS at 07:36:00

## 2017-06-19 RX ADMIN — GLYCOPYRROLATE 0.4 MG: 0.2 INJECTION INTRAMUSCULAR; INTRAVENOUS at 09:27:00

## 2017-06-19 RX ADMIN — LIDOCAINE HYDROCHLORIDE 50 MG: 10 INJECTION, SOLUTION EPIDURAL; INFILTRATION; INTRACAUDAL; PERINEURAL at 07:41:00

## 2017-06-19 RX ADMIN — ROCURONIUM BROMIDE 50 MG: 10 INJECTION, SOLUTION INTRAVENOUS at 07:41:00

## 2017-06-19 NOTE — OPERATIVE REPORT
Abdominal Hysterectomy Operative Note      Date:  6/19/2017    Patient Name: Brent Santo    Preoperative Diagnosis: Post menopausal bleeding, fibroid uterus     Postoperative Diagnosis: Post menopausal bleeding, fibroid uterus     Primary Surgeon: Fredy Mitchell clamped, cut, and suture ligated with 0-Vicryl. The winsome clamps were placed below the cervix and cut. The specimen was sent off the surgical field.  The vaginal cuff angles were closed with figure of eight sutures using 0-Vicryl incorporating the Jarrell

## 2017-06-19 NOTE — RESPIRATORY THERAPY NOTE
Kvng Duron FOUZIA ASSESSMENT:    Pt does have a previous diagnosis of FOUZIA. Pt does routinely use a CPAP device at home. CPAP INITIATION:    Pt to be placed on CPAP: yes  Pt refused: no  CPAP settings: Auto Pap 5 min cm H2O. 20 max cm H2O.  Mask Option:  Full face  Int

## 2017-06-19 NOTE — TELEPHONE ENCOUNTER
Nurse from PACU calling for [facility] [phone], states she needs an order for a pt she just did surgery on. [facility] pager given to Jovanny Hammond RN.

## 2017-06-19 NOTE — ANESTHESIA PREPROCEDURE EVALUATION
Anesthesia PreOp Note    HPI:     Joreg Rhodes is a 80year old female who presents for preoperative consultation requested by: Jacqueline Nguyen MD    Date of Surgery: 6/19/2017    Procedure(s):  ABDOMINAL HYSTERECTOMY, BSO  Indication: Post menopausal ble BOTH KNEES   • Esophageal reflux    • High blood pressure    • Cataract AGE RELATED 10/2015   • Shortness of breath    • Sleep apnea      cpap   • Restless leg syndrome    • Peripheral edema    • Asthma            Past Surgical History    TUBAL LIGATION monthly ) Disp: 3 capsule Rfl: 3 6/17/2017   multiple vitamin Oral Chew Tab Chew 1 tablet by mouth daily. Disp:  Rfl:  6/18/2017 at 0900   modafinil 200 MG Oral Tab Take 1 tablet (200 mg total) by mouth daily.  (Patient taking differently: Take 200 mg by mo No    Sexual Activity: Not on file   Not on file  Other Topics Concern   None on file     Social History Narrative       Available pre-op labs reviewed.     Lab Results  Component Value Date   WBC 6.5 06/19/2017   RBC 4.50 06/19/2017   HGB 12.6 06/19/2017 any alternative forms of anesthetic management. All of the patient's questions were answered to the best of my ability. The patient desires the anesthetic management as planned.   BOY CAIN  6/19/2017 6:53 AM

## 2017-06-19 NOTE — ANESTHESIA POSTPROCEDURE EVALUATION
Patient: Pepper Cook    Procedure Summary     Date Anesthesia Start Anesthesia Stop Room / Location    06/19/17 0736 [id number] [facility] MAIN OR 04 / 300 [facility] MAIN OR       Procedure Diagnosis Surgeon Responsible Provider    ABDOMINAL HYSTERECTOMY, BSO (N/A Abdomen)

## 2017-06-19 NOTE — INTERVAL H&P NOTE
Pre-op Diagnosis: Post menopausal bleeding, fibroid uterus     The above referenced H&P was reviewed by Jonnie Guerrero MD on 6/19/2017, the patient was examined and no significant changes have occurred in the patient's condition since the H&P was performed.   I

## 2017-06-20 RX ORDER — ENOXAPARIN SODIUM 100 MG/ML
30 INJECTION SUBCUTANEOUS EVERY 12 HOURS SCHEDULED
Status: DISCONTINUED | OUTPATIENT
Start: 2017-06-20 | End: 2017-06-21

## 2017-06-20 NOTE — RESPIRATORY THERAPY NOTE
RESPIRATORY THERAPY CPAP PROGRESS NOTE:    Patient is compliant with nightly CPAP: No  Patient refused: No  AutoCPAP in use: Yes  [facility] EPAP noted: 8cmH20  CPAP interface:Full face mask  Patient tolerating: well

## 2017-06-20 NOTE — CM/SW NOTE
CTL met with pt at bedside. Prior to admission pt lived in a house with her son, dtr-in-law and 2 other family members. Pt states that she was independent with all ADL's including driving and shopping. Pt has been up to bathroom and voided.   States is p

## 2017-06-20 NOTE — PROGRESS NOTES
[facility]    Gyn Progress Note      Saniya Carney Patient Status:  Inpatient    1945 MRN [id number]   Location [facility] 4W/SW/SE Attending Lois Feldman MD   Hosp Day # 1 PCP Delvis Gandhi MD       Subjective     Good p

## 2017-06-20 NOTE — PROGRESS NOTES
[facility] Pharmacy Note:  Weight Dose Adjustment for: Lovenox    Vidya April is a 80year old female who has been prescribed Lovenox 40 mg sc q24 hours. CrCl is estimated creatinine clearance is 54 mL/min (based on Cr of 0.75).  and pt has a weight 113 kg

## 2017-06-21 VITALS
BODY MASS INDEX: 46.6 KG/M2 | RESPIRATION RATE: 18 BRPM | TEMPERATURE: 98 F | WEIGHT: 250 LBS | HEART RATE: 52 BPM | HEIGHT: 61.5 IN | OXYGEN SATURATION: 92 % | SYSTOLIC BLOOD PRESSURE: 129 MMHG | DIASTOLIC BLOOD PRESSURE: 53 MMHG

## 2017-06-21 RX ORDER — HYDROCODONE BITARTRATE AND ACETAMINOPHEN 7.5; 325 MG/1; MG/1
1 TABLET ORAL EVERY 6 HOURS PRN
Qty: 30 TABLET | Refills: 0 | Status: SHIPPED | OUTPATIENT
Start: 2017-06-21

## 2017-06-21 RX ORDER — IBUPROFEN 600 MG/1
600 TABLET ORAL EVERY 6 HOURS PRN
Qty: 30 TABLET | Refills: 0 | Status: SHIPPED | OUTPATIENT
Start: 2017-06-21 | End: 2017-07-20

## 2017-06-21 NOTE — PROGRESS NOTES
[facility]    Gyn Progress Note      Riesa Patient Patient Status:  Inpatient    1945 MRN [id number]   Location [facility] 4W/SW/SE Attending Demetrius Martinez MD   Hosp Day # 2 PCP Rob Glass MD       Subjective     Good p

## 2017-06-21 NOTE — RESPIRATORY THERAPY NOTE
RESPIRATORY THERAPY CPAP PROGRESS NOTE:    Patient is compliant with nightly CPAP: yes  Patient refused: no  AutoCPAP in use: yes     CPAP interface:Full face  Patient tolerating: well

## 2017-06-21 NOTE — DISCHARGE SUMMARY
[facility]    Discharge Summary    Valeria Hong Patient Status:  Inpatient    1945 MRN [id number]   Location [facility] 4W/SW/SE Attending Hua Gong MD   Hosp Day # 2 PCP Bev Dowell MD     Date of Admission:  needed.     Quantity:  30 tablet   Refills:  0         CHANGE how you take these medications       Instructions Prescription details    ergocalciferol 29292 units Caps   Commonly known as:  DRISDOL/VITAMIN D2   What changed:    - how much to take  - when to days    Quantity:  120 g   Refills:  2       multiple vitamin Chew        Chew 1 tablet by mouth daily. Refills:  0       Oxybutynin Chloride 5 MG Tabs   Commonly known as:  DITROPAN        Take 1 tablet (5 mg total) by mouth 2 (two) times daily.     Daikwaku Cutting

## 2017-07-20 ENCOUNTER — OFFICE VISIT (OUTPATIENT)
Dept: OBGYN CLINIC | Facility: CLINIC | Age: 72
End: 2017-07-20

## 2017-07-20 VITALS
HEART RATE: 67 BPM | BODY MASS INDEX: 45 KG/M2 | WEIGHT: 242 LBS | DIASTOLIC BLOOD PRESSURE: 56 MMHG | SYSTOLIC BLOOD PRESSURE: 87 MMHG

## 2017-07-20 DIAGNOSIS — Z98.890 POST-OPERATIVE STATE: Primary | ICD-10-CM

## 2017-07-20 PROCEDURE — 99024 POSTOP FOLLOW-UP VISIT: CPT | Performed by: OBSTETRICS & GYNECOLOGY

## 2017-07-20 RX ORDER — IBUPROFEN 600 MG/1
600 TABLET ORAL EVERY 6 HOURS PRN
Qty: 30 TABLET | Refills: 1 | Status: SHIPPED | OUTPATIENT
Start: 2017-07-20

## 2017-07-20 NOTE — PROGRESS NOTES
Osmin German is a 80year old female  No LMP recorded. Patient has had a hysterectomy. Patient presents with:  Post-Op: 4 week Postop visit for Hysterectomy. Reporting still having soreness     Here for post op exam.   Had LUCY/BSO on 17. tablet Rfl: 0   POTASSIUM CHLORIDE ER 20 MEQ Oral Tab CR TAKE 1 TABLET BY MOUTH DAILY Disp: 30 tablet Rfl: 0   ergocalciferol 13579 units Oral Cap Take 1 po monthly (Patient taking differently: 50,000 Units once a week.  Take 1 po monthly ) Disp: 3 capsule wash  Erythromycin            Rash  Nuts                    Hives  Other                   Rash    Comment:ALLERGIC TO A SKIN DISINFECTANT CALLED ZAFRINE      PHYSICAL EXAM:   BP (!) 87/56 (BP Location: Right arm, Patient Position: Sitting, Cuff Size: larg

## 2017-08-03 ENCOUNTER — TELEPHONE (OUTPATIENT)
Dept: INTERNAL MEDICINE CLINIC | Facility: CLINIC | Age: 72
End: 2017-08-03

## 2017-08-03 NOTE — TELEPHONE ENCOUNTER
Pharmacy called to follow up.        Montelukast Sodium 10 MG Oral Tab TAKE 1 TABLET BY MOUTH NIGHTLY Disp: 30 tablet Rfl: 1

## 2017-08-09 NOTE — TELEPHONE ENCOUNTER
This medication was last filled 12/8 30/1 refill. Calling to see how frequently pt takes medication and for what symptoms. Please transfer to  [phone], thank you.

## 2017-08-10 ENCOUNTER — TELEPHONE (OUTPATIENT)
Dept: OBGYN CLINIC | Facility: CLINIC | Age: 72
End: 2017-08-10

## 2017-08-10 RX ORDER — MONTELUKAST SODIUM 10 MG/1
TABLET ORAL
Qty: 30 TABLET | Refills: 1 | Status: SHIPPED | OUTPATIENT
Start: 2017-08-10

## 2017-08-10 NOTE — TELEPHONE ENCOUNTER
Refill Protocol Appointment Criteria  · Appointment scheduled in the past 6 months or in the next 3 months  Recent Outpatient Visits            3 weeks ago Post-operative [facility] [place] [facility] for Ajith Millan MD    Off

## 2017-08-10 NOTE — TELEPHONE ENCOUNTER
Routed to [facility] staff, to Redirect. Patient was sent to [place] [id number] in error.

## 2017-08-30 DIAGNOSIS — R32 URINARY INCONTINENCE, UNSPECIFIED TYPE: ICD-10-CM

## 2017-08-31 RX ORDER — FUROSEMIDE 20 MG/1
TABLET ORAL
Qty: 60 TABLET | Refills: 0 | Status: SHIPPED | OUTPATIENT
Start: 2017-08-31 | End: 2017-10-03

## 2017-09-01 RX ORDER — OXYBUTYNIN CHLORIDE 5 MG/1
TABLET ORAL
Qty: 60 TABLET | Refills: 0 | Status: SHIPPED | OUTPATIENT
Start: 2017-09-01 | End: 2017-10-03

## 2017-09-01 NOTE — TELEPHONE ENCOUNTER
Refilled per protocol.    Refill Protocol Appointment Criteria  · Appointment scheduled in the past 6 months or in the next 3 months  Recent Outpatient Visits            1 month ago Post-operative [facility] for Health, [address], [place]

## 2017-10-03 DIAGNOSIS — R60.0 EDEMA OF BOTH LEGS: ICD-10-CM

## 2017-10-03 DIAGNOSIS — I10 ESSENTIAL HYPERTENSION WITH GOAL BLOOD PRESSURE LESS THAN 140/90: ICD-10-CM

## 2017-10-03 DIAGNOSIS — R32 URINARY INCONTINENCE, UNSPECIFIED TYPE: ICD-10-CM

## 2017-10-04 RX ORDER — FUROSEMIDE 20 MG/1
TABLET ORAL
Qty: 60 TABLET | Refills: 0 | Status: SHIPPED | OUTPATIENT
Start: 2017-10-04 | End: 2017-11-13

## 2017-10-04 RX ORDER — CARVEDILOL 6.25 MG/1
TABLET ORAL
Qty: 60 TABLET | Refills: 0 | Status: SHIPPED | OUTPATIENT
Start: 2017-10-04 | End: 2017-10-15

## 2017-10-04 RX ORDER — OXYBUTYNIN CHLORIDE 5 MG/1
TABLET ORAL
Qty: 60 TABLET | Refills: 0 | Status: SHIPPED | OUTPATIENT
Start: 2017-10-04 | End: 2017-11-13

## 2017-10-04 NOTE — TELEPHONE ENCOUNTER
Hypertensive Medications  Protocol Criteria:  · Appointment scheduled in the past 6 months or in the next 3 months  · BMP or CMP in the past 12 months  · Creatinine result < 2  Recent Outpatient Visits            2 months ago Post-operative [facility]    [facility] Raymond Cardenas MD    Office Visit    6 months ago Preoperative clearance    Harris Edmondson MD    Office Visit

## 2017-10-12 RX ORDER — GABAPENTIN 600 MG/1
TABLET ORAL
Qty: 60 TABLET | Refills: 2 | Status: SHIPPED | OUTPATIENT
Start: 2017-10-12 | End: 2018-03-23

## 2017-10-12 RX ORDER — POTASSIUM CHLORIDE 20 MEQ/1
TABLET, EXTENDED RELEASE ORAL
Qty: 30 TABLET | Refills: 2 | Status: SHIPPED | OUTPATIENT
Start: 2017-10-12 | End: 2018-03-23

## 2017-10-12 NOTE — TELEPHONE ENCOUNTER
Called Pt to inform rx were sent to pharmacy and receipt confirmed by them . Pt stated she has not called the office to inquire about the rx and it might be the pharmacy calling . Pt verbalized understanding denied questions .

## 2017-10-12 NOTE — TELEPHONE ENCOUNTER
Refill Protocol Appointment Criteria  · Appointment scheduled in the past 6 months or in the next 3 months  Recent Outpatient Visits            2 months ago Post-operative [facility] [place] [facility] for Jose Alejandro Cruz MD    Of

## 2017-10-15 DIAGNOSIS — I10 ESSENTIAL HYPERTENSION WITH GOAL BLOOD PRESSURE LESS THAN 140/90: ICD-10-CM

## 2017-10-19 RX ORDER — CARVEDILOL 6.25 MG/1
TABLET ORAL
Qty: 60 TABLET | Refills: 1 | Status: SHIPPED | OUTPATIENT
Start: 2017-10-19 | End: 2018-01-28

## 2017-11-13 DIAGNOSIS — R32 URINARY INCONTINENCE, UNSPECIFIED TYPE: ICD-10-CM

## 2017-11-15 RX ORDER — FUROSEMIDE 20 MG/1
TABLET ORAL
Qty: 180 TABLET | Refills: 1 | Status: SHIPPED | OUTPATIENT
Start: 2017-11-15

## 2017-11-15 RX ORDER — OXYBUTYNIN CHLORIDE 5 MG/1
TABLET ORAL
Qty: 180 TABLET | Refills: 1 | Status: SHIPPED | OUTPATIENT
Start: 2017-11-15

## 2017-12-01 ENCOUNTER — TELEPHONE (OUTPATIENT)
Dept: INTERNAL MEDICINE CLINIC | Facility: CLINIC | Age: 72
End: 2017-12-01

## 2017-12-06 NOTE — TELEPHONE ENCOUNTER
Please advise on refill request. Patient stated that she takes Celecoxib for arthritis of knees and it was stopped for her June surgery, but she resumed it afterwards.  Medication was not on her med list. Scheduled an appointment for her annual exam for 12/18/17 at 11 am.    Recent Outpatient Visits            4 months ago Post-operative [facility]    Office Visit    7 months ago Mixed hyperlipidemia    [address] Card[facility] Arlin Arriola MD    Office Visit    7 months ago Preoperative clearance    Murleen Sandifer, MD    Office Visit    7 months ago Age-related nuclear cataract of both eyes    [facility] Ped Opthamology Karen Collins MD    Office Visit    8 months ago Preoperative clearance    [facility], Nirmala Saxena MD    Office Visit

## 2017-12-07 RX ORDER — CELECOXIB 200 MG/1
CAPSULE ORAL
Qty: 90 CAPSULE | Refills: 0 | Status: SHIPPED | OUTPATIENT
Start: 2017-12-07 | End: 2018-03-23

## 2017-12-18 ENCOUNTER — OFFICE VISIT (OUTPATIENT)
Dept: INTERNAL MEDICINE CLINIC | Facility: CLINIC | Age: 72
End: 2017-12-18

## 2017-12-18 ENCOUNTER — APPOINTMENT (OUTPATIENT)
Dept: LAB | Age: 72
End: 2017-12-18
Attending: INTERNAL MEDICINE
Payer: MEDICARE

## 2017-12-18 VITALS
TEMPERATURE: 98 F | DIASTOLIC BLOOD PRESSURE: 77 MMHG | SYSTOLIC BLOOD PRESSURE: 124 MMHG | HEART RATE: 52 BPM | WEIGHT: 243 LBS | BODY MASS INDEX: 45.88 KG/M2 | HEIGHT: 61 IN

## 2017-12-18 DIAGNOSIS — I10 ESSENTIAL HYPERTENSION WITH GOAL BLOOD PRESSURE LESS THAN 140/90: ICD-10-CM

## 2017-12-18 DIAGNOSIS — M15.9 OSTEOARTHRITIS OF MULTIPLE JOINTS, UNSPECIFIED OSTEOARTHRITIS TYPE: ICD-10-CM

## 2017-12-18 DIAGNOSIS — G47.33 SLEEP APNEA, OBSTRUCTIVE: ICD-10-CM

## 2017-12-18 DIAGNOSIS — Z78.0 POSTMENOPAUSAL: ICD-10-CM

## 2017-12-18 DIAGNOSIS — Z00.00 MEDICARE ANNUAL WELLNESS VISIT, SUBSEQUENT: Primary | ICD-10-CM

## 2017-12-18 DIAGNOSIS — J30.9 CHRONIC ALLERGIC RHINITIS, UNSPECIFIED SEASONALITY, UNSPECIFIED TRIGGER: ICD-10-CM

## 2017-12-18 DIAGNOSIS — R32 URINARY INCONTINENCE, UNSPECIFIED TYPE: ICD-10-CM

## 2017-12-18 DIAGNOSIS — J44.9 ASTHMA WITH COPD (CHRONIC OBSTRUCTIVE PULMONARY DISEASE) (HCC): ICD-10-CM

## 2017-12-18 DIAGNOSIS — G25.81 RESTLESS LEGS SYNDROME: ICD-10-CM

## 2017-12-18 DIAGNOSIS — E78.5 HYPERLIPIDEMIA, UNSPECIFIED HYPERLIPIDEMIA TYPE: ICD-10-CM

## 2017-12-18 DIAGNOSIS — E55.9 VITAMIN D DEFICIENCY: ICD-10-CM

## 2017-12-18 DIAGNOSIS — K21.9 GASTROESOPHAGEAL REFLUX DISEASE, ESOPHAGITIS PRESENCE NOT SPECIFIED: ICD-10-CM

## 2017-12-18 DIAGNOSIS — R73.9 HYPERGLYCEMIA: ICD-10-CM

## 2017-12-18 DIAGNOSIS — Z12.31 VISIT FOR SCREENING MAMMOGRAM: ICD-10-CM

## 2017-12-18 DIAGNOSIS — E66.01 MORBID OBESITY DUE TO EXCESS CALORIES (HCC): ICD-10-CM

## 2017-12-18 PROBLEM — N95.0 POST-MENOPAUSAL BLEEDING: Status: RESOLVED | Noted: 2017-06-19 | Resolved: 2017-12-18

## 2017-12-18 PROBLEM — H04.123 BILATERAL DRY EYES: Status: RESOLVED | Noted: 2017-04-21 | Resolved: 2017-12-18

## 2017-12-18 PROBLEM — H01.00A BLEPHARITIS OF UPPER AND LOWER EYELIDS OF BOTH EYES: Status: RESOLVED | Noted: 2017-04-21 | Resolved: 2017-12-18

## 2017-12-18 PROBLEM — H01.00B BLEPHARITIS OF UPPER AND LOWER EYELIDS OF BOTH EYES: Status: RESOLVED | Noted: 2017-04-21 | Resolved: 2017-12-18

## 2017-12-18 PROBLEM — J44.89 ASTHMA WITH COPD (CHRONIC OBSTRUCTIVE PULMONARY DISEASE): Chronic | Status: ACTIVE | Noted: 2017-12-18

## 2017-12-18 PROBLEM — D25.9 UTERINE LEIOMYOMA: Status: RESOLVED | Noted: 2017-06-19 | Resolved: 2017-12-18

## 2017-12-18 PROBLEM — H25.13 AGE-RELATED NUCLEAR CATARACT OF BOTH EYES: Status: RESOLVED | Noted: 2017-04-21 | Resolved: 2017-12-18

## 2017-12-18 PROCEDURE — 84443 ASSAY THYROID STIM HORMONE: CPT

## 2017-12-18 PROCEDURE — 82306 VITAMIN D 25 HYDROXY: CPT

## 2017-12-18 PROCEDURE — 84439 ASSAY OF FREE THYROXINE: CPT

## 2017-12-18 PROCEDURE — 81015 MICROSCOPIC EXAM OF URINE: CPT

## 2017-12-18 PROCEDURE — 85027 COMPLETE CBC AUTOMATED: CPT

## 2017-12-18 PROCEDURE — G0008 ADMIN INFLUENZA VIRUS VAC: HCPCS | Performed by: INTERNAL MEDICINE

## 2017-12-18 PROCEDURE — 80053 COMPREHEN METABOLIC PANEL: CPT

## 2017-12-18 PROCEDURE — 90732 PPSV23 VACC 2 YRS+ SUBQ/IM: CPT | Performed by: INTERNAL MEDICINE

## 2017-12-18 PROCEDURE — 90472 IMMUNIZATION ADMIN EACH ADD: CPT | Performed by: INTERNAL MEDICINE

## 2017-12-18 PROCEDURE — 80061 LIPID PANEL: CPT

## 2017-12-18 PROCEDURE — 83036 HEMOGLOBIN GLYCOSYLATED A1C: CPT

## 2017-12-18 PROCEDURE — 99213 OFFICE O/P EST LOW 20 MIN: CPT | Performed by: INTERNAL MEDICINE

## 2017-12-18 PROCEDURE — 36415 COLL VENOUS BLD VENIPUNCTURE: CPT

## 2017-12-18 PROCEDURE — 90653 IIV ADJUVANT VACCINE IM: CPT | Performed by: INTERNAL MEDICINE

## 2017-12-18 PROCEDURE — G0009 ADMIN PNEUMOCOCCAL VACCINE: HCPCS | Performed by: INTERNAL MEDICINE

## 2017-12-18 PROCEDURE — G0463 HOSPITAL OUTPT CLINIC VISIT: HCPCS | Performed by: INTERNAL MEDICINE

## 2017-12-18 PROCEDURE — 96160 PT-FOCUSED HLTH RISK ASSMT: CPT | Performed by: INTERNAL MEDICINE

## 2017-12-18 NOTE — PROGRESS NOTES
HPI:    Patient ID: Troy Doherty is a 71year old female.     [facility]    Medicare annual exam and follow of chronic medical problems  Including but not limited to    HTN  Long standing history of hypertension     sympotms  :        Headache no  dizziness TABLET BY MOUTH TWICE DAILY.  Disp: 60 tablet Rfl: 2   POTASSIUM CHLORIDE ER 20 MEQ Oral Tab CR TAKE 1 TABLET BY MOUTH DAILY Disp: 30 tablet Rfl: 2   Montelukast Sodium 10 MG Oral Tab TAKE 1 TABLET BY MOUTH NIGHTLY Disp: 30 tablet Rfl: 1   ibuprofen 600 MG mouth 2 (two) times daily. Disp: 60 tablet Rfl: 2   aspirin 325 MG Oral Tab Take 325 mg by mouth daily. Disp:  Rfl:      Allergies:  Adhesive Tape           Rash    Comment:Skin tears  Aloe Vesta              Rash, Itching    Comment: Body wash  Erythromyci reactive to light. Right eye exhibits no discharge. Left eye exhibits no discharge. No scleral icterus. Neck: Neck supple. No thyromegaly present. Cardiovascular: Normal rate, regular rhythm, normal heart sounds and intact distal pulses.   Exam reveals with COPD (chronic obstructive pulmonary disease) (HCC)  Plan: chornic stable    (E55.9) Vitamin D deficiency  Plan: supplement    (R32) Urinary incontinence, unspecified type  Plan: follow up with urology    (G25.81) Restless legs syndrome  Plan: chronic (gastroesophageal reflux disease)     Urinary incontinence     Mixed hyperlipidemia     Hyperglycemia     Hyperopia with presbyopia of both eyes     Borderline diabetic     Asthma with COPD (chronic obstructive pulmonary disease) ([facility] [address]      General Health Questionnaire completed by patient and sent to HIM for scanning? No    Please go to \"Cognitive Assessment\" under Medicare Assessment section in Charting, test patient and document. Then, refresh your progress note to see your input here.   Cognitive Ass External Cream Apply bid to affected area x 14 days Disp: 120 g Rfl: 2   multiple vitamin Oral Chew Tab Chew 1 tablet by mouth daily.  Disp:  Rfl:    Albuterol Sulfate  (90 BASE) MCG/ACT Inhalation Aero Soln Inhale 2 puffs into the lungs 4 (four) va Heart Disorder Brother    • Glaucoma Brother    • Diabetes Other    • Macular degeneration Neg       SOCIAL HISTORY:   Smoking status: Former Smoker                                                              Packs/day: 1.00      Years: 35.00        Types Screen every 10 years Colonoscopy,5 Years due on 01/12/2021 Update Health Maintenance if applicable    Flex Sigmoidoscopy Screen every 10 years No results found for this or any previous visit. No flowsheet data found.      Fecal Occult Blood Annually No res Creatinine (mg/dL)   Date Value   12/18/2017 0.84     CREATININE (P) (mg/dL)   Date Value   09/16/2016 0.81    No flowsheet data found. BUN  Annually BUN (mg/dL)   Date Value   12/18/2017 24 (H)   09/16/2016 21 (H)    No flowsheet data found.      Drug S orders found for this or any previous visit.          SUGGESTED VACCINATIONS - Influenza, Pneumococcal, Zoster, Tetanus     Immunization History  Administered            Date(s) Administered    Fluad High dose 72 yr and older (89987)

## 2018-01-10 ENCOUNTER — TELEPHONE (OUTPATIENT)
Dept: INTERNAL MEDICINE CLINIC | Facility: CLINIC | Age: 73
End: 2018-01-10

## 2018-01-28 DIAGNOSIS — I10 ESSENTIAL HYPERTENSION WITH GOAL BLOOD PRESSURE LESS THAN 140/90: ICD-10-CM

## 2018-01-31 RX ORDER — CARVEDILOL 6.25 MG/1
TABLET ORAL
Qty: 60 TABLET | Refills: 2 | Status: SHIPPED | OUTPATIENT
Start: 2018-01-31 | End: 2018-03-23

## 2018-01-31 NOTE — TELEPHONE ENCOUNTER
Hypertensive Medications: Refilled per protocol    Protocol Criteria:  · Appointment scheduled in the past 6 months or in the next 3 months  · BMP or CMP in the past 12 months  · Creatinine result < 2  Recent Outpatient Visits            1 month ago [facility]

## 2018-03-23 DIAGNOSIS — I10 ESSENTIAL HYPERTENSION WITH GOAL BLOOD PRESSURE LESS THAN 140/90: ICD-10-CM

## 2018-03-23 DIAGNOSIS — R60.0 EDEMA OF BOTH LEGS: ICD-10-CM

## 2018-03-23 RX ORDER — POTASSIUM CHLORIDE 20 MEQ/1
20 TABLET, EXTENDED RELEASE ORAL
Qty: 90 TABLET | Refills: 0 | Status: SHIPPED | OUTPATIENT
Start: 2018-03-23

## 2018-03-23 RX ORDER — CELECOXIB 200 MG/1
CAPSULE ORAL
Qty: 90 CAPSULE | Refills: 0 | Status: SHIPPED | OUTPATIENT
Start: 2018-03-23

## 2018-03-23 RX ORDER — CARVEDILOL 6.25 MG/1
TABLET ORAL
Qty: 180 TABLET | Refills: 0 | Status: SHIPPED | OUTPATIENT
Start: 2018-03-23

## 2018-03-23 RX ORDER — GABAPENTIN 600 MG/1
600 TABLET ORAL 2 TIMES DAILY
Qty: 180 TABLET | Refills: 0 | Status: SHIPPED | OUTPATIENT
Start: 2018-03-23

## 2018-03-23 NOTE — TELEPHONE ENCOUNTER
Please advise on refill request. For Dr Josi Stallings. [place] pharmacy. See patient's note.     Hypertensive Medications  Protocol Criteria:  · Appointment scheduled in the past 6 months or in the next 3 months  · BMP or CMP in the past 12 months  · Creatin Cardiology Shanit Singer MD    Office Visit    11 months ago Preoperative clearance    Oren Blizzard, MD    Office Visit    11 months ago Age-related nuclear cataract of both eyes    [facility] [place] [facility] for [address]

## 2018-03-23 NOTE — TELEPHONE ENCOUNTER
Pt is requesting refills :   She just moved to [facility] and is looking for a new pcp. They don't have anything till May, pt says. pls advise. Pharmacy updated in [email or web address]. Pt is requesting 3 mo supply.        Current Outpatient Prescriptions:  CARVEDILOL 6.25 MG

## 2019-01-16 RX ORDER — FUROSEMIDE 20 MG/1
TABLET ORAL
Qty: 60 TABLET | Refills: 2 | OUTPATIENT
Start: 2019-01-16

## 2019-01-16 RX ORDER — OXYBUTYNIN CHLORIDE 5 MG/1
TABLET ORAL
Qty: 60 TABLET | Refills: 0 | OUTPATIENT
Start: 2019-01-16

## 2019-01-16 RX ORDER — CARVEDILOL 6.25 MG/1
TABLET ORAL
Qty: 60 TABLET | Refills: 2 | OUTPATIENT
Start: 2019-01-16

## 2020-12-15 ENCOUNTER — TELEPHONE (OUTPATIENT)
Dept: GASTROENTEROLOGY | Facility: CLINIC | Age: 75
End: 2020-12-15

## 2020-12-15 NOTE — TELEPHONE ENCOUNTER
----- Message from Lisa Jett RN sent at 1/14/2016 10:37 AM CST -----  Regarding: CLN recall  5 year CLN recall, per Dr. Josefina Long; CLN done 1/12/16

## 2021-02-16 NOTE — TELEPHONE ENCOUNTER
Letter was returned to us labeled \"moved\"   I called and left a voicemail message for patient to call us back.

## 2021-03-03 DIAGNOSIS — Z23 NEED FOR VACCINATION: ICD-10-CM

## 2024-08-06 NOTE — ASSESSMENT & PLAN NOTE
Advised pt to use OTC Systane artifical tears as needed during the day and Systane Gel at bedtime.
Discussed very early cataracts with patient. No treatment recommended at this time.
New glasses today; suggest update.
No background of retinopathy, no signs of neovascularization noted.
Patient instructed to use lid hygiene twice daily. Apply baby shampoo to warm washcloth and scrub eyelids gently with eyes closed, then rinse thoroughly.
No indicators present

## (undated) DEVICE — UNDYED BRAIDED (POLYGLACTIN 910), SYNTHETIC ABSORBABLE SUTURE: Brand: COATED VICRYL

## (undated) DEVICE — STERILE LATEX POWDER-FREE SURGICAL GLOVESWITH NITRILE COATING: Brand: PROTEXIS

## (undated) DEVICE — SPONGE: SPECIALTY PEANUT XR 100/CS: Brand: MEDICAL ACTION INDUSTRIES

## (undated) DEVICE — Device

## (undated) DEVICE — SPONGE LAP 18X18 XRAY STRL

## (undated) DEVICE — USE ITEM #176901

## (undated) DEVICE — SOL H2O 1000ML BTL

## (undated) DEVICE — DRAPE SHEET TRANSVERSE LAP

## (undated) DEVICE — CONTAINER SPEC STR 4OZ GRY LID

## (undated) DEVICE — 3M™ MEDITPORE™ SOFT CLOTH TAPE 6 IN X 10 YD 12 ROLLS/CASE 2966: Brand: 3M™ MEDIPORE™

## (undated) DEVICE — SUTURE VICRYL 0 J340H

## (undated) DEVICE — 3M™ STERI-STRIP™ COMPOUND BENZOIN TINCTURE 40 BAGS/CARTON 4 CARTONS/CASE C1544: Brand: 3M™ STERI-STRIP™

## (undated) DEVICE — WOUND RETRACTOR AND PROTECTOR: Brand: ALEXIS O WOUND PROTECTOR-RETRACTOR

## (undated) DEVICE — CHLORAPREP 26ML APPLICATOR

## (undated) DEVICE — SUTURE PLAIN GUT 3-0 CT-1

## (undated) DEVICE — LAPAROTOMY: Brand: MEDLINE INDUSTRIES, INC.

## (undated) DEVICE — KENDALL SCD EXPRESS SLEEVES, KNEE LENGTH, MEDIUM: Brand: KENDALL SCD

## (undated) DEVICE — SUTURE VICRYL 0 CT-1

## (undated) DEVICE — 3M™ STERI-STRIP™ REINFORCED ADHESIVE SKIN CLOSURES, R1547, 1/2 IN X 4 IN (12 MM X 100 MM), 6 STRIPS/ENVELOPE: Brand: 3M™ STERI-STRIP™

## (undated) NOTE — MR AVS SNAPSHOT
[place]  [address]  [phone]               Thank you for choosing us for your health care visit with [facility].  Tangela Pride MD.  We are glad to serve you and happy to provide you with this dolan Borderline diabetic  No background of retinopathy, no signs of neovascularization noted. Bilateral dry eyes  Advised pt to use OTC Systane artifical tears as needed during the day and Systane Gel at bedtime.             Allergies as of Apr 21, 20 Chew 1 tablet by mouth daily. Oxybutynin Chloride 5 MG Tabs   Take 1 tablet (5 mg total) by mouth 2 (two) times daily. Commonly known as:  DITROPAN           Potassium Chloride ER 20 MEQ Tbcr   Take 20 mEq by mouth daily.            Potassium Ch

## (undated) NOTE — MR AVS SNAPSHOT
[address]  [address]  [address] [place] [address]  [phone]               Thank you for choosing us for your health care visit with Bruno Contreras MD.  We are glad to serve you and happy to provide you with this summary insurance company's guidelines for prior authorization for this test.  You may be held responsible for payment in full if proper authorization is not acquired. Please contact the Patient Business Office at [phone] if you have any questions related to i furosemide 20 MG Tabs   Take 1 tablet (20 mg total) by mouth 2 (two) times daily. Commonly known as:  LASIX           gabapentin 600 MG Tabs   Take 1 tablet (600 mg total) by mouth 2 (two) times daily.    Commonly known as:  Tiny Ros

## (undated) NOTE — MR AVS SNAPSHOT
[address]  [address] [facility]  [phone]               Thank you for choosing us for your health care visit with José Luis Diamond MD.  We are glad to serve you and happy to provide you with this summar Instructions:   To schedule a test at any [facility], call Central Scheduling at [phone], Monday through Friday between 7:30am to 6pm and on Saturday between 8am and 1pm. Evening and weekend appointments for your exam are a Current Medications          This list is accurate as of: 2/27/17  9:10 AM.  Always use your most recent med list.                Albuterol Sulfate  (90 Base) MCG/ACT Aers   Inhale 2 puffs into the lungs 4 (four) times daily as needed for [facility]

## (undated) NOTE — LETTER
12/15/2020    Nydia Bernabe        [address] [place] [address]            Dear Nydia Bernabe,      Our records indicate that you are due for an appointment for a Colonoscopy in January 2021, or shortly there after, with Stella Guerrero

## (undated) NOTE — Clinical Note
MAJOR CASE PREOPERATIVE INSTRUCTIONS    3/14/2017      Dear Reta Mancera are having a Total abdominal hysterectomy with bilateral salpingo oophorectomy on 5/22/17 at 7:30am.    Do not eat or drink anything (including water) after midnight the night befo

## (undated) NOTE — MR AVS SNAPSHOT
[address], [place]  [address]  [id number] [phone]               Thank you for choosing us for your health care visit with Delvis Gandhi MD.  We are glad to serve you and happy to provide you with this summary of This list is accurate as of: 4/24/17 11:59 PM.  Always use your most recent med list.                Albuterol Sulfate  (90 Base) MCG/ACT Aers   Inhale 2 puffs into the lungs 4 (four) times daily as needed for Wheezing.            aspirin 325 MG Tabs Commonly known as:  K-AARTI M20           Rosuvastatin Calcium 10 MG Tabs   TAKE 1 TABLET BY MOUTH EVERY NIGHT   Commonly known as:  CRESTOR                Where to Get Your Medications      These medications were sent to [address] [place] [address]

## (undated) NOTE — MR AVS SNAPSHOT
[place]  [address]  [phone]               Thank you for choosing us for your health care visit with Juni Fu MD.  We are glad to serve you and happy to provide you with this summary of Take 1 tablet (20 mg total) by mouth 2 (two) times daily. Commonly known as:  LASIX           gabapentin 600 MG Tabs   Take 1 tablet (600 mg total) by mouth 2 (two) times daily.    Commonly known as:  NEURONTIN           modafinil 200 MG Tabs   Take 1 tab

## (undated) NOTE — Clinical Note
April 1, 2017    [address] [place] [phone]      Dear Rodrigo Moses: The following are the results of your recent tests. Please review the list of test results.   Your result is the value on the left; we have also supplied the ran Anion Gap 8 0-18   BUN/CREA Ratio 22.4 (H) 10.0-20.0   Calculated Osmolality 303 (H) 275-295 mOsm/kg   GFR, Non-African American >60 >=60   GFR, -American >60 >=60   -CBC, PLATELET; NO DIFFERENTIAL   Result Value Ref Range   WBC 5.5 4.0-11.0 K/UL

## (undated) NOTE — IP AVS SNAPSHOT
[address] [facility] [phone]                Discharge Summary   6/19/2017    Delgado Felix           Admission Information        Provider Department    6/19/2017 Thang Avery MD [facility] 4w/Sw/S Esomeprazole Magnesium 40 MG Cpdr   Commonly known as:  NEXIUM   What changed:  when to take this        Take 1 capsule (40 mg total) by mouth every morning before breakfast.    Reina Nuñez                           modafinil 200 MG Tabs   Commonly Last time this was given:  10 mg on 6/20/2017  8:27 PM   Commonly known as:  SINGULAIR        TAKE 1 TABLET BY MOUTH NIGHTLY    Reina Nuñez                        multiple vitamin Chew        Chew 1 tablet by mouth daily.                             O Pneumococcal (Prevnar 13) 9/16/2016      Recent Hematology Lab Results  (Last 3 results in the past 90 days)    WBC RBC Hemoglobin Hematocrit MCV MCH MCHC RDW Platelet MPV    (65/91/32)  8.1 (06/20/17)  4.08 (06/20/17)  11.5 (L) (06/20/17)  33.8 (L) (06/2 Medications Returned:           Additional Information       We are concerned for your overall well being:    - If you are a smoker or have smoked in the last 12 months, we encourage you to explore options for quitting.     - If you have concerns related t and/or abnormal heart rates/rhythms   Most common side effects: Dizziness or feeling lightheaded (especially with standing), heart rate changes, headaches, nausea/vomiting   What to report to your healthcare team:  Dizziness, nausea, chest pain, weakness, Most common side effects:  Depends on the specific medication but generally include: diarrhea, constipation, headache, allergic reaction (itching, rash, hives)   What to report to your healthcare team: Pain, nausea/vomiting, no bowel movement in 2+ days, d

## (undated) NOTE — MR AVS SNAPSHOT
[address]  [address] [facility]  [phone]               Thank you for choosing us for your health care visit with Sybil Saavedra MD.  We are glad to serve you and happy to provide you with this summar XR CHEST PA + LAT CHEST (CPT=71020)    Complete by:   Mar 22, 2017 (Approximate)    Assoc Dx:  Preop cardiovascular exam [A58.659]                 Scheduling Instructions     Wednesday March 22, 2017     Imaging:  XR CHEST PA + LAT CHEST (CPT=71020)    Ins Take 1 capsule (40 mg total) by mouth every morning before breakfast.   Commonly known as:  NEXIUM           furosemide 20 MG Tabs   Take 1 tablet (20 mg total) by mouth 2 (two) times daily.    Commonly known as:  LASIX           gabapentin 600 MG Tabs   Ta

## (undated) NOTE — Clinical Note
AUTHORIZATION FOR SURGICAL OPERATION OR OTHER PROCEDURE    1. I hereby authorize Dr. Joey Arreguin, and [place] [facility] staff assigned to my case to perform the following operation and/or procedure [facility]:    Endometrial Biopsy    2.   My p Relationship to Patient:             Parent    Responsible person                            Spouse  In case of minor or                     Other  _____________   Incompetent name:  __________________________________________________

## (undated) NOTE — MR AVS SNAPSHOT
[place]  [address]  [phone]               Thank you for choosing us for your health care visit with Dusty Carlin MD.  We are glad to serve you and happy to provide you with this summary of Commonly known as:  CHLOR-TRIMETON           Esomeprazole Magnesium 40 MG Cpdr   Take 1 capsule (40 mg total) by mouth every morning before breakfast.   Commonly known as:  NEXIUM           furosemide 20 MG Tabs   Take 1 tablet (20 mg total) by mouth 2 (tw

## (undated) NOTE — MR AVS SNAPSHOT
After Visit Summary   4/24/2017    Siddhartha Eaton    MRN: [id number]           Visit Information        Provider Department Dept Phone    4/24/2017  2:00 PM Dimitri Walton MD [facility] [phone]      Your Vitals Were     BP Pulse Temp Chlorpheniramine Maleate 4 MG Oral Tab Take 4 mg by mouth every 6 (six) hours as needed for Allergies. Calcium Carbonate-Vitamin D 500-200 MG-UNIT Oral Tab Take 1 tablet by mouth 2 (two) times daily.       Diagnoses for This Visit     Preoperative clear [employer] Activation Code: [email or web address]  Expires: 6/26/2017  9:39 AM      Enter your Zip Code and Date of Birth (mm/dd/yyyy) as indicated and click Next. You will be taken to the next sign-up page. Create a [employer] Username.  This will be your [employer] login

## (undated) NOTE — Clinical Note
2/10/2017              [address] [place] [phone]         Dear Talib Patel,    I have reviewed your most recent download from your CPAP machine. You are still having 10.5 events where you stop breathing per hour.  I

## (undated) NOTE — Clinical Note
3/14/2017              [place] [facility] [address]         Dear Dr. Lamin Lainez,    This letter is to inform you that your patient is scheduled to have a Total abdominal hysterectomy with bilateral oophorectomy on 5/